# Patient Record
Sex: FEMALE | Race: ASIAN | NOT HISPANIC OR LATINO | Employment: OTHER | URBAN - METROPOLITAN AREA
[De-identification: names, ages, dates, MRNs, and addresses within clinical notes are randomized per-mention and may not be internally consistent; named-entity substitution may affect disease eponyms.]

---

## 2019-08-21 PROBLEM — M10.9 ACUTE GOUT OF LEFT FOOT: Status: ACTIVE | Noted: 2019-08-21

## 2019-08-21 PROBLEM — J30.1 SEASONAL ALLERGIC RHINITIS DUE TO POLLEN: Status: ACTIVE | Noted: 2019-08-21

## 2019-11-06 PROBLEM — Z72.0 TOBACCO USE: Status: ACTIVE | Noted: 2019-11-06

## 2021-01-08 ENCOUNTER — PATIENT MESSAGE (OUTPATIENT)
Dept: FAMILY MEDICINE | Facility: CLINIC | Age: 76
End: 2021-01-08

## 2021-01-08 ENCOUNTER — IMMUNIZATION (OUTPATIENT)
Dept: FAMILY MEDICINE | Facility: CLINIC | Age: 76
End: 2021-01-08
Payer: MEDICARE

## 2021-01-08 DIAGNOSIS — Z23 NEED FOR VACCINATION: ICD-10-CM

## 2021-01-08 PROCEDURE — 91300 COVID-19, MRNA, LNP-S, PF, 30 MCG/0.3 ML DOSE VACCINE: CPT | Mod: PBBFAC | Performed by: INTERNAL MEDICINE

## 2021-01-29 ENCOUNTER — IMMUNIZATION (OUTPATIENT)
Dept: FAMILY MEDICINE | Facility: CLINIC | Age: 76
End: 2021-01-29
Payer: MEDICARE

## 2021-01-29 DIAGNOSIS — Z23 NEED FOR VACCINATION: Primary | ICD-10-CM

## 2021-01-29 PROCEDURE — 91300 COVID-19, MRNA, LNP-S, PF, 30 MCG/0.3 ML DOSE VACCINE: CPT | Mod: PBBFAC | Performed by: FAMILY MEDICINE

## 2021-01-29 PROCEDURE — 0002A COVID-19, MRNA, LNP-S, PF, 30 MCG/0.3 ML DOSE VACCINE: CPT | Mod: PBBFAC | Performed by: FAMILY MEDICINE

## 2021-08-17 ENCOUNTER — IMMUNIZATION (OUTPATIENT)
Dept: FAMILY MEDICINE | Facility: CLINIC | Age: 76
End: 2021-08-17
Payer: COMMERCIAL

## 2021-08-17 DIAGNOSIS — Z23 NEED FOR VACCINATION: Primary | ICD-10-CM

## 2021-08-17 PROCEDURE — 91300 COVID-19, MRNA, LNP-S, PF, 30 MCG/0.3 ML DOSE VACCINE: CPT | Mod: PBBFAC | Performed by: RADIOLOGY

## 2022-02-17 PROBLEM — Z98.890 HISTORY OF LOOP RECORDER: Status: ACTIVE | Noted: 2022-02-17

## 2022-06-14 PROBLEM — Z86.79 HISTORY OF ATRIAL FIBRILLATION: Status: ACTIVE | Noted: 2022-06-14

## 2022-06-14 PROBLEM — M1A.9XX0 CHRONIC GOUT WITHOUT TOPHUS: Status: ACTIVE | Noted: 2022-06-14

## 2022-06-14 PROBLEM — I51.89 DIASTOLIC DYSFUNCTION: Status: ACTIVE | Noted: 2022-06-09

## 2022-06-14 PROBLEM — R45.89 ANXIETY ABOUT HEALTH: Status: ACTIVE | Noted: 2022-06-14

## 2022-06-14 PROBLEM — M62.838 MUSCLE SPASM OF BOTH LOWER LEGS: Status: ACTIVE | Noted: 2022-06-14

## 2022-06-15 PROBLEM — M10.9 ACUTE GOUT OF LEFT FOOT: Status: RESOLVED | Noted: 2019-08-21 | Resolved: 2022-06-15

## 2022-08-02 PROBLEM — L30.9 PSORIASIS-ECZEMA OVERLAP CONDITION: Status: ACTIVE | Noted: 2022-08-02

## 2023-05-02 PROBLEM — I70.0 AORTIC ATHEROSCLEROSIS: Status: ACTIVE | Noted: 2023-05-02

## 2023-05-02 PROBLEM — E66.01 SEVERE OBESITY (BMI 35.0-39.9) WITH COMORBIDITY: Status: ACTIVE | Noted: 2023-05-02

## 2023-08-03 ENCOUNTER — OFFICE VISIT (OUTPATIENT)
Dept: PODIATRY | Facility: CLINIC | Age: 78
End: 2023-08-03
Payer: MEDICARE

## 2023-08-03 DIAGNOSIS — M21.41 PES PLANUS OF BOTH FEET: ICD-10-CM

## 2023-08-03 DIAGNOSIS — M77.8 EXTENSOR TENDONITIS OF FOOT: Primary | ICD-10-CM

## 2023-08-03 DIAGNOSIS — M21.42 PES PLANUS OF BOTH FEET: ICD-10-CM

## 2023-08-03 PROCEDURE — 99203 OFFICE O/P NEW LOW 30 MIN: CPT | Mod: S$PBB,,, | Performed by: PODIATRIST

## 2023-08-03 PROCEDURE — 99999 PR PBB SHADOW E&M-EST. PATIENT-LVL II: CPT | Mod: PBBFAC,,, | Performed by: PODIATRIST

## 2023-08-03 PROCEDURE — 99203 PR OFFICE/OUTPT VISIT, NEW, LEVL III, 30-44 MIN: ICD-10-PCS | Mod: S$PBB,,, | Performed by: PODIATRIST

## 2023-08-03 PROCEDURE — 99212 OFFICE O/P EST SF 10 MIN: CPT | Mod: PBBFAC,PN | Performed by: PODIATRIST

## 2023-08-03 PROCEDURE — 99999 PR PBB SHADOW E&M-EST. PATIENT-LVL II: ICD-10-PCS | Mod: PBBFAC,,, | Performed by: PODIATRIST

## 2023-08-03 NOTE — PATIENT INSTRUCTIONS
- Recommend wearing supportive shoes only.  Avoid barefoot walking, flip flops, and Crocs, as this will exacerbate current symptoms.      - Shoes: Asics, Hoka or New Balance.      - Recommend icing the affected area a minimum of 20 minutes daily.    - Avoid high impact activities such as squatting, stooping, and running as these activities will exacerbate symptoms.      - May consider applying a topical analgesic (Voltaren cream) to help with pain symptoms.

## 2023-08-04 NOTE — PROGRESS NOTES
Subjective:     Patient ID: Silvia Bernal is a 77 y.o. female.    Chief Complaint: No chief complaint on file.    Silvia is a 77 y.o. female with a past medical history of Anxiety disorder, Arthralgia, Atrial fibrillation, Bruxism, Coagulation defect, Dizziness, LAWTON (dyspnea on exertion) (03/10/2016), Fatigue, History of chicken pox, Hyperglycemia, Hypertension, essential, Hypopotassemia, Irritable bowel syndrome (IBS), Obesity, SYLVAIN on CPAP, Osteoporosis, Polycythemia, secondary, PONV (postoperative nausea and vomiting), Thyroid nodule, Tobacco use, quit 4 yrs ago (11/6/2019), and Weight gain. The patient's chief complaint consists of bilateral foot pain that has been present for months.  Describes pain as burning that is localized to the dorsum of bilateral foot.  States symptoms occur initially with weight bearing and being to improve with sustained activity.  Denies an inciting event leading to said symptoms.  She attempts to be active in spite of said issue.  Notes a tendency to wear  style tennis shoes.  She has not attempted to self treat.  Denies any additional pedal complaints.      Past Medical History:   Diagnosis Date    Anxiety disorder     Arthralgia     Atrial fibrillation     RESOLVED    Bruxism     Coagulation defect     Dizziness     LAWTON (dyspnea on exertion) 03/10/2016    Fatigue     History of chicken pox     Hyperglycemia     Hypertension, essential     Hypopotassemia     Irritable bowel syndrome (IBS)     Obesity     SYLVAIN on CPAP     Osteoporosis     Polycythemia, secondary     PONV (postoperative nausea and vomiting)     Thyroid nodule     Tobacco use, quit 4 yrs ago 11/6/2019    Weight gain        Past Surgical History:   Procedure Laterality Date    anterior hip replacement Left 06/27/2013    bone spur removed  10/2009    CARDIAC CATHETERIZATION  03.2009, 03/10/69124    HYSTERECTOMY  1999    INSERTION OF IMPLANTABLE LOOP RECORDER      JOINT REPLACEMENT       LEFT HEART CATHETERIZATION Left 2022    Procedure: Left heart cath;  Surgeon: Kilo Maki III, MD;  Location: [facility];  Service: Cardiology;  Laterality: Left;    TOTAL ABDOMINAL HYSTERECTOMY W/ BILATERAL SALPINGOOPHORECTOMY      half an ovary was removed    TOTAL HIP ARTHROPLASTY Bilateral 03/10/2011    tubial ligation         Family History   Problem Relation Age of Onset    Cirrhosis Mother     Heart disease Father     Hypertension Father     Hypertension Sister     Diabetes Sister     Hypertension Sister     Diabetes Sister     Hypertension Sister     Diabetes Sister     Hypertension Sister     Diabetes Maternal Grandmother        Social History     Socioeconomic History    Marital status:    Tobacco Use    Smoking status: Former     Current packs/day: 0.00     Average packs/day: 1 pack/day for 50.0 years (50.0 ttl pk-yrs)     Types: Cigarettes     Start date: 1968     Quit date: 2018     Years since quittin.7    Smokeless tobacco: Never    Tobacco comments:     social only at parties   Substance and Sexual Activity    Alcohol use: Yes     Alcohol/week: 0.0 standard drinks of alcohol     Comment: very occasionally    Drug use: No   Social History Narrative    ** Merged History Encounter **            Current Outpatient Medications   Medication Sig Dispense Refill    allopurinoL (ZYLOPRIM) 100 MG tablet TAKE 2 TABLETS ONCE DAILY 180 tablet 1    ALPRAZolam (XANAX) 0.5 MG tablet Take 0.5 mg by mouth 2 (two) times daily as needed.      amLODIPine (NORVASC) 5 MG tablet TAKE 1 TABLET ONCE DAILY 90 tablet 3    ASPIRIN LOW DOSE ORAL Take 81 mg by mouth once daily.      calcitrioL (VECTICAL) 3 mcg/gram ointment Apply topically 2 (two) times daily. 100 g 1    cholecalciferol, vitamin D3, (VITAMIN D3) 25 mcg (1,000 unit) capsule Take 3,000 Units by mouth once daily.      compress.stocking,knee,reg,med Misc 1 Pair by Misc.(Non-Drug; Combo Route) route once  daily. 15-20 mmHg 2 each 1    EMU OIL, BULK, MISC 1 applicator by Misc.(Non-Drug; Combo Route) route as needed.      estradiol (ESTRACE) 0.5 MG tablet Take 0.5 mg by mouth Every 3 (three) days.       fluticasone propionate (FLONASE) 50 mcg/actuation nasal spray 2 sprays (100 mcg total) by Each Nostril route once daily. 16 mL 6    furosemide (LASIX) 20 MG tablet TAKE 1 TABLET (20 MG TOTAL) BY MOUTH DAILY AS NEEDED (SWELLING OR WEIGHT GAIN). 90 tablet 0    indomethacin (INDOCIN) 50 MG capsule Take 50 mg by mouth daily as needed.      levothyroxine (SYNTHROID) 50 MCG tablet Take 1 tablet (50 mcg total) by mouth once daily. 90 tablet 3    magnesium 250 mg Tab Take 1 tablet (250 mg total) by mouth every evening.  0    montelukast (SINGULAIR) 10 mg tablet TAKE 1 TABLET EVERY EVENING 90 tablet 3    naproxen (NAPROSYN) 500 MG tablet Take 1 tablet (500 mg total) by mouth 2 (two) times daily as needed (left lower back pain). 30 tablet 1    polyethylene glycol 400 (BLINK TEARS OPHT) Apply 1 drop to eye daily as needed.      potassium chloride (K-TAB) 20 mEq Take 1 tablet (20 mEq total) by mouth every morning. 90 tablet 3    ramipriL (ALTACE) 10 MG capsule TAKE 1 CAPSULE TWICE DAILY 180 capsule 3    SKYRIZI 150 mg/mL Syrg Inject into the skin.      VITAMIN B COMPLEX (B COMPLEX ORAL) Take 1 tablet by mouth once daily.       No current facility-administered medications for this visit.       Review of patient's allergies indicates:   Allergen Reactions    Phenergan [promethazine]     Adhesive tape-silicones      SURGICAL TAPE-ITCHY AND PEELS    Decongestant [cpm-phenyleph-acetaminophen]      palpitations    Fish containing products     Garlic     Mold     Msg [glutamic acid hcl]      Makes her sleepy    Unable to assess      Salt      Venom-honey bee      Bee Stings        Hemoglobin A1C   Date Value Ref Range Status   05/25/2022 6.2 (H) 0.0 - 5.6 % Final     Comment:     Reference Interval:  5.0 - 5.6  Normal   5.7 - 6.4 High Risk   > 6.5 Diabetic      Hgb A1c results are standardized based on the (NGSP) National   Glycohemoglobin Standardization Program.      Hemoglobin A1C levels are related to mean serum/plasma glucose   during the preceding 2-3 months.        10/08/2021 6.0 (H) 0.0 - 5.6 % Final     Comment:     Reference Interval:  5.0 - 5.6 Normal   5.7 - 6.4 High Risk   > 6.5 Diabetic      Hgb A1c results are standardized based on the (NGSP) National   Glycohemoglobin Standardization Program.      Hemoglobin A1C levels are related to mean serum/plasma glucose   during the preceding 2-3 months.        03/18/2021 5.6 0.0 - 5.6 % Final     Comment:     Reference Interval:  5.0 - 5.6 Normal   5.7 - 6.4 High Risk   > 6.5 Diabetic      Hgb A1c results are standardized based on the (NGSP) National   Glycohemoglobin Standardization Program.      Hemoglobin A1C levels are related to mean serum/plasma glucose   during the preceding 2-3 months.            Review of Systems   Constitutional: Negative for chills and fever.   Cardiovascular:  Negative for claudication and leg swelling.   Skin:  Negative for color change and nail changes.   Musculoskeletal:  Positive for myalgias. Negative for joint swelling, muscle cramps and muscle weakness.   Gastrointestinal:  Negative for nausea and vomiting.   Neurological:  Negative for numbness and paresthesias.   Psychiatric/Behavioral:  Negative for altered mental status.         Objective:     Physical Exam  Constitutional:       Appearance: Normal appearance. She is not ill-appearing.   Cardiovascular:      Pulses:           Dorsalis pedis pulses are 2+ on the right side and 2+ on the left side.        Posterior tibial pulses are 2+ on the right side and 2+ on the left side.      Comments: CFT is < 3 seconds bilateral.  Pedal hair growth is present bilateral.  No lower extremity edema noted bilateral.  Toes are warm to touch bilateral.     Musculoskeletal:         General:  Tenderness and deformity present. No signs of injury.      Right lower leg: No edema.      Left lower leg: No edema.      Comments: Muscle strength 5/5 in all muscle groups bilateral.  No tenderness nor crepitation with ROM of foot/ankle joints bilateral.  Mild pain with palpation to the extensor tendons, of bilateral foot, as they course along the midfoot.  Bilateral pes planus foot type.  Bilateral HAV.  Bilateral semi-reducible contracture of toes 2-5.     Skin:     General: Skin is warm and dry.      Capillary Refill: Capillary refill takes 2 to 3 seconds.      Findings: No bruising, ecchymosis, erythema, signs of injury, laceration, lesion, petechiae, rash or wound.      Comments: Pedal skin has normal turgor, temperature, and texture bilateral.  Toenails x 10 appear normotrophic. Examination of the skin reveals no evidence of significant maceration, rashes, open lesions, suspicious appearing nevi or other concerning lesions.       Neurological:      General: No focal deficit present.      Mental Status: She is alert.      Sensory: No sensory deficit.      Motor: No weakness or atrophy.      Comments: Light touch is intact bilateral.  (-) tinel sign bilateral.  Protective sensation is intact bilateral.           Assessment:      Encounter Diagnoses   Name Primary?    Extensor tendonitis of foot Yes    Pes planus of both feet      Plan:     Diagnoses and all orders for this visit:    Extensor tendonitis of foot  -     Ambulatory referral/consult to Physical/Occupational Therapy; Future    Pes planus of both feet  -     Ambulatory referral/consult to Physical/Occupational Therapy; Future      I counseled the patient on her conditions, their implications and medical management.    - Based on today's exam, pain symptoms are consistent with extensor tendonitis secondary to bilateral pes planus foot deformity.     - Recommend wearing supportive shoes only.  Discussed avoidance of barefoot walking, flip flops, and  Crocs, as this will exacerbate current symptoms.       - Recommend icing the affected area a minimum of 20 minutes daily.    - Discussed avoidance of high impact activities such as squatting, stooping, and running as these activities will exacerbate symptoms.       - May consider applying a topical analgesic (Voltaren cream) to help with pain symptoms.     - Referral written for PT for astym and dry needling.      - RTC prn or sooner if symptoms fail to resolve within 6 weeks.       Jose J Sykes DPM

## 2023-09-22 ENCOUNTER — OFFICE VISIT (OUTPATIENT)
Dept: PODIATRY | Facility: CLINIC | Age: 78
End: 2023-09-22
Payer: MEDICARE

## 2023-09-22 VITALS — BODY MASS INDEX: 36.73 KG/M2 | HEIGHT: 58 IN | WEIGHT: 175 LBS

## 2023-09-22 DIAGNOSIS — M79.671 FOOT PAIN, BILATERAL: ICD-10-CM

## 2023-09-22 DIAGNOSIS — M21.42 PES PLANUS OF BOTH FEET: Primary | ICD-10-CM

## 2023-09-22 DIAGNOSIS — M21.41 PES PLANUS OF BOTH FEET: Primary | ICD-10-CM

## 2023-09-22 DIAGNOSIS — M79.672 FOOT PAIN, BILATERAL: ICD-10-CM

## 2023-09-22 PROCEDURE — 99999 PR PBB SHADOW E&M-EST. PATIENT-LVL II: CPT | Mod: PBBFAC,,, | Performed by: PODIATRIST

## 2023-09-22 PROCEDURE — 99999 PR PBB SHADOW E&M-EST. PATIENT-LVL II: ICD-10-PCS | Mod: PBBFAC,,, | Performed by: PODIATRIST

## 2023-09-22 PROCEDURE — 99212 OFFICE O/P EST SF 10 MIN: CPT | Mod: PBBFAC,PN | Performed by: PODIATRIST

## 2023-09-22 PROCEDURE — 99213 OFFICE O/P EST LOW 20 MIN: CPT | Mod: S$PBB,,, | Performed by: PODIATRIST

## 2023-09-22 PROCEDURE — 99213 PR OFFICE/OUTPT VISIT, EST, LEVL III, 20-29 MIN: ICD-10-PCS | Mod: S$PBB,,, | Performed by: PODIATRIST

## 2023-09-23 NOTE — PROGRESS NOTES
Subjective:     Patient ID: Silvia Bernal is a 78 y.o. female.    Chief Complaint: Follow-up  Patient presents to clinic for roughly a 6 week follow up for bilateral EDL tendonitis.  Relates attending PT which has resulted in noticeable improvement in her muscle strength.  However, states she continues to experience deep aching pain in the feet with all weight bearing.  Rates pain currently as a 0/10 while at rest.  She continues wearing supportive shoe gear and is performing her stretching exercises.   Denies any additional pedal complaints.      Past Medical History:   Diagnosis Date    Anxiety disorder     Arthralgia     Atrial fibrillation     RESOLVED    Bruxism     Coagulation defect     Dizziness     LAWTON (dyspnea on exertion) 03/10/2016    Fatigue     History of chicken pox     Hyperglycemia     Hypertension, essential     Hypopotassemia     Irritable bowel syndrome (IBS)     Obesity     SYLVAIN on CPAP     Osteoporosis     Polycythemia, secondary     PONV (postoperative nausea and vomiting)     Thyroid nodule     Tobacco use, quit 4 yrs ago 11/6/2019    Weight gain        Past Surgical History:   Procedure Laterality Date    anterior hip replacement Left 06/27/2013    bone spur removed  10/2009    CARDIAC CATHETERIZATION  03.2009, 03/10/98198    HYSTERECTOMY  1999    INSERTION OF IMPLANTABLE LOOP RECORDER      JOINT REPLACEMENT      LEFT HEART CATHETERIZATION Left 6/6/2022    Procedure: Left heart cath;  Surgeon: Kilo Maki III, MD;  [facility];  Service: Cardiology;  Laterality: Left;    TOTAL ABDOMINAL HYSTERECTOMY W/ BILATERAL SALPINGOOPHORECTOMY  1999    half an ovary was removed    TOTAL HIP ARTHROPLASTY Bilateral 03/10/2011    tubial ligation         Family History   Problem Relation Age of Onset    Cirrhosis Mother     Heart disease Father     Hypertension Father     Hypertension Sister     Diabetes Sister     Hypertension Sister     Diabetes Sister     Hypertension Sister     Diabetes  Sister     Hypertension Sister     Diabetes Maternal Grandmother        Social History     Socioeconomic History    Marital status:    Tobacco Use    Smoking status: Former     Current packs/day: 0.00     Average packs/day: 1 pack/day for 50.0 years (50.0 ttl pk-yrs)     Types: Cigarettes     Start date: 1968     Quit date: 2018     Years since quittin.8    Smokeless tobacco: Never    Tobacco comments:     social only at parties   Substance and Sexual Activity    Alcohol use: Yes     Alcohol/week: 0.0 standard drinks of alcohol     Comment: very occasionally    Drug use: No   Social History Narrative    ** Merged History Encounter **            Current Outpatient Medications   Medication Sig Dispense Refill    allopurinoL (ZYLOPRIM) 100 MG tablet TAKE 2 TABLETS ONCE DAILY 180 tablet 1    ALPRAZolam (XANAX) 0.5 MG tablet Take 0.5 mg by mouth 2 (two) times daily as needed.      amLODIPine (NORVASC) 5 MG tablet TAKE 1 TABLET ONCE DAILY 90 tablet 3    ASPIRIN LOW DOSE ORAL Take 81 mg by mouth once daily.      calcitrioL (VECTICAL) 3 mcg/gram ointment Apply topically 2 (two) times daily. 100 g 1    cholecalciferol, vitamin D3, (VITAMIN D3) 25 mcg (1,000 unit) capsule Take 3,000 Units by mouth once daily.      compress.stocking,knee,reg,med Misc 1 Pair by Misc.(Non-Drug; Combo Route) route once daily. 15-20 mmHg 2 each 1    EMU OIL, BULK, MISC 1 applicator by Misc.(Non-Drug; Combo Route) route as needed.      estradiol (ESTRACE) 0.5 MG tablet Take 0.5 mg by mouth Every 3 (three) days.       fluticasone propionate (FLONASE) 50 mcg/actuation nasal spray 2 sprays (100 mcg total) by Each Nostril route once daily. 16 mL 6    furosemide (LASIX) 20 MG tablet Take 1 tablet (20 mg total) by mouth daily as needed (swelling or weight gain). 90 tablet 1    indomethacin (INDOCIN) 50 MG capsule Take 50 mg by mouth daily as needed.      levothyroxine (SYNTHROID) 50 MCG tablet TAKE 1 TABLET BY MOUTH EVERY DAY 90  tablet 3    magnesium 250 mg Tab Take 1 tablet (250 mg total) by mouth every evening.  0    montelukast (SINGULAIR) 10 mg tablet TAKE 1 TABLET EVERY EVENING 90 tablet 3    naproxen (NAPROSYN) 500 MG tablet Take 1 tablet (500 mg total) by mouth 2 (two) times daily as needed (left lower back pain). 30 tablet 1    polyethylene glycol 400 (BLINK TEARS OPHT) Apply 1 drop to eye daily as needed.      potassium chloride (K-TAB) 20 mEq Take 1 tablet (20 mEq total) by mouth every morning. 90 tablet 3    ramipriL (ALTACE) 10 MG capsule TAKE 1 CAPSULE TWICE DAILY 180 capsule 3    SKYRIZI 150 mg/mL Syrg Inject into the skin.      VITAMIN B COMPLEX (B COMPLEX ORAL) Take 1 tablet by mouth once daily.       No current facility-administered medications for this visit.       Review of patient's allergies indicates:   Allergen Reactions    Phenergan [promethazine]     Adhesive tape-silicones      SURGICAL TAPE-ITCHY AND PEELS    Decongestant [cpm-phenyleph-acetaminophen]      palpitations    Fish containing products     Garlic     Mold     Msg [glutamic acid hcl]      Makes her sleepy    Unable to assess      Salt      Venom-honey bee      Bee Stings        Hemoglobin A1C   Date Value Ref Range Status   05/25/2022 6.2 (H) 0.0 - 5.6 % Final     Comment:     Reference Interval:  5.0 - 5.6 Normal   5.7 - 6.4 High Risk   > 6.5 Diabetic      Hgb A1c results are standardized based on the (NGSP) National   Glycohemoglobin Standardization Program.      Hemoglobin A1C levels are related to mean serum/plasma glucose   during the preceding 2-3 months.        10/08/2021 6.0 (H) 0.0 - 5.6 % Final     Comment:     Reference Interval:  5.0 - 5.6 Normal   5.7 - 6.4 High Risk   > 6.5 Diabetic      Hgb A1c results are standardized based on the (NGSP) National   Glycohemoglobin Standardization Program.      Hemoglobin A1C levels are related to mean serum/plasma glucose   during the preceding 2-3 months.        03/18/2021 5.6 0.0 - 5.6 % Final      Comment:     Reference Interval:  5.0 - 5.6 Normal   5.7 - 6.4 High Risk   > 6.5 Diabetic      Hgb A1c results are standardized based on the (NGSP) National   Glycohemoglobin Standardization Program.      Hemoglobin A1C levels are related to mean serum/plasma glucose   during the preceding 2-3 months.            Review of Systems   Constitutional: Negative for chills and fever.   Cardiovascular:  Negative for claudication and leg swelling.   Skin:  Negative for color change and nail changes.   Musculoskeletal:  Positive for myalgias. Negative for joint swelling, muscle cramps and muscle weakness.   Gastrointestinal:  Negative for nausea and vomiting.   Neurological:  Negative for numbness and paresthesias.   Psychiatric/Behavioral:  Negative for altered mental status.         Objective:     Physical Exam  Constitutional:       Appearance: Normal appearance. She is not ill-appearing.   Cardiovascular:      Pulses:           Dorsalis pedis pulses are 2+ on the right side and 2+ on the left side.        Posterior tibial pulses are 2+ on the right side and 2+ on the left side.      Comments: CFT is < 3 seconds bilateral.  Pedal hair growth is present bilateral.  No lower extremity edema noted bilateral.  Toes are warm to touch bilateral.     Musculoskeletal:         General: Deformity present. No tenderness or signs of injury.      Right lower leg: No edema.      Left lower leg: No edema.      Comments: Muscle strength 5/5 in all muscle groups bilateral.  No tenderness nor crepitation with ROM of foot/ankle joints bilateral.  (-) for pain with palpation to the extensor tendons, of bilateral foot, as they course along the midfoot.  Bilateral pes planus foot type.  Bilateral HAV.  Bilateral semi-reducible contracture of toes 2-5.     Skin:     General: Skin is warm and dry.      Capillary Refill: Capillary refill takes 2 to 3 seconds.      Findings: No bruising, ecchymosis, erythema, signs of injury, laceration, lesion,  petechiae, rash or wound.      Comments: Pedal skin has normal turgor, temperature, and texture bilateral.  Toenails x 10 appear normotrophic. Examination of the skin reveals no evidence of significant maceration, rashes, open lesions, suspicious appearing nevi or other concerning lesions.       Neurological:      General: No focal deficit present.      Mental Status: She is alert.      Sensory: No sensory deficit.      Motor: No weakness or atrophy.      Comments: Light touch is intact bilateral.  (-) tinel sign bilateral.  Protective sensation is intact bilateral.             Assessment:      Encounter Diagnoses   Name Primary?    Pes planus of both feet Yes    Foot pain, bilateral      Plan:     Silvia was seen today for follow-up.    Diagnoses and all orders for this visit:    Pes planus of both feet  -     ORTHOTIC DEVICE (DME)    Foot pain, bilateral      I counseled the patient on her conditions, their implications and medical management.    - With today's exam, prior EDL tendonitis has resolved.  Current pain symptoms are secondary to her bilateral pes planus foot deformity.    - Rx written for a custom molded orthotic with medial heel wedge to limit overpronation.    - Given the name of a local vendor who provides this service.    - Recommend wearing supportive shoes only.  Discussed avoidance of barefoot walking, flip flops, and Crocs, as this will exacerbate current symptoms.       - RTC 2 weeks after obtaining the custom molded orthotics to assess the fit and to see if there needs to be modifications to the Rx.     Jose J Sykes DPM

## 2023-10-23 ENCOUNTER — TELEPHONE (OUTPATIENT)
Dept: PODIATRY | Facility: CLINIC | Age: 78
End: 2023-10-23
Payer: MEDICARE

## 2023-10-23 NOTE — TELEPHONE ENCOUNTER
Spoke with patient and she voiced understanding that she may want to see her PCP as her S/S are not indicative of orthotic use

## 2023-12-28 ENCOUNTER — OFFICE VISIT (OUTPATIENT)
Dept: PRIMARY CARE CLINIC | Facility: CLINIC | Age: 78
End: 2023-12-28
Payer: MEDICARE

## 2023-12-28 VITALS
HEART RATE: 73 BPM | HEIGHT: 58 IN | OXYGEN SATURATION: 97 % | BODY MASS INDEX: 37.6 KG/M2 | SYSTOLIC BLOOD PRESSURE: 138 MMHG | DIASTOLIC BLOOD PRESSURE: 70 MMHG | WEIGHT: 179.13 LBS

## 2023-12-28 DIAGNOSIS — Z87.891 HISTORY OF NICOTINE DEPENDENCE: ICD-10-CM

## 2023-12-28 DIAGNOSIS — G47.33 OSA ON CPAP: ICD-10-CM

## 2023-12-28 DIAGNOSIS — Z72.0 TOBACCO USE: ICD-10-CM

## 2023-12-28 DIAGNOSIS — E78.00 HYPERCHOLESTEROLEMIA: ICD-10-CM

## 2023-12-28 DIAGNOSIS — Z98.890 HISTORY OF LOOP RECORDER: ICD-10-CM

## 2023-12-28 DIAGNOSIS — M1A.9XX0 CHRONIC GOUT WITHOUT TOPHUS, UNSPECIFIED CAUSE, UNSPECIFIED SITE: ICD-10-CM

## 2023-12-28 DIAGNOSIS — I70.0 AORTIC ATHEROSCLEROSIS: ICD-10-CM

## 2023-12-28 DIAGNOSIS — I10 HYPERTENSION, ESSENTIAL: ICD-10-CM

## 2023-12-28 DIAGNOSIS — I51.89 DIASTOLIC DYSFUNCTION: ICD-10-CM

## 2023-12-28 DIAGNOSIS — Z76.89 ENCOUNTER TO ESTABLISH CARE: Primary | ICD-10-CM

## 2023-12-28 DIAGNOSIS — J30.1 SEASONAL ALLERGIC RHINITIS DUE TO POLLEN: ICD-10-CM

## 2023-12-28 DIAGNOSIS — E03.9 ACQUIRED HYPOTHYROIDISM: ICD-10-CM

## 2023-12-28 DIAGNOSIS — I48.0 PAROXYSMAL ATRIAL FIBRILLATION: ICD-10-CM

## 2023-12-28 DIAGNOSIS — Z86.79 HISTORY OF ATRIAL FIBRILLATION: ICD-10-CM

## 2023-12-28 DIAGNOSIS — E66.01 SEVERE OBESITY (BMI 35.0-39.9) WITH COMORBIDITY: ICD-10-CM

## 2023-12-28 PROCEDURE — 99214 OFFICE O/P EST MOD 30 MIN: CPT | Mod: PBBFAC,PN | Performed by: INTERNAL MEDICINE

## 2023-12-28 PROCEDURE — 99999 PR PBB SHADOW E&M-EST. PATIENT-LVL IV: CPT | Mod: PBBFAC,,, | Performed by: INTERNAL MEDICINE

## 2023-12-28 PROCEDURE — 99205 OFFICE O/P NEW HI 60 MIN: CPT | Mod: S$PBB,,, | Performed by: INTERNAL MEDICINE

## 2023-12-28 RX ORDER — AZELASTINE 1 MG/ML
1 SPRAY, METERED NASAL 2 TIMES DAILY
Qty: 30 ML | Refills: 0 | Status: SHIPPED | OUTPATIENT
Start: 2023-12-28 | End: 2024-12-27

## 2023-12-28 RX ORDER — MULTIVIT,STRESS FORMULA/ZINC
TABLET ORAL
COMMUNITY
End: 2024-02-02

## 2023-12-28 RX ORDER — GLUCOSAMINE/CHONDRO SU A 500-400 MG
1 TABLET ORAL 2 TIMES DAILY
COMMUNITY
End: 2024-05-28

## 2023-12-28 NOTE — PROGRESS NOTES
[facility] INITIAL VISIT [facility]      CHIEF COMPLAINT     Chief Complaint   Patient presents with    Establish Care     Patient presents to establish care.    Allergic Rhinitis      Patient is having clear sinus drainage and congestion, and has been on Singulair for years.    Psoriasis     Patient is on Skyrizi and has questions about exposure, since she said she is immunocompromised.    COVID-19 Post Vaccine Symptoms     Patient tested positive x11 days ago.       HPI     Silvia Bernal is a 78 y.o.  female who presents with a PMHx of  history of Afib, HTN, HLD, IBS, SYLVAIN on CPAP, osteoporosis, thyroid nodule, ex-smoker (quit in 2015) who presents today to establish care.     Her main complaints and concerns are:   Allergies. Discussed treatment    Anxiety: says her anxiety is well controlled. Says has lot of friends and social support   GAD7 score is 7/21    IBS: with both constipation/diarrhea. Mostly constipation    SYLVAIN: on CPAP which she reports she does use every night.       CHRONIC HEALTH ISSUES:   Past Medical History:  Past Medical History:   Diagnosis Date    Anxiety disorder     Arthralgia     Atrial fibrillation     RESOLVED    Bruxism     Coagulation defect     Dizziness     LAWTON (dyspnea on exertion) 03/10/2016    Fatigue     History of chicken pox     Hyperglycemia     Hypertension, essential     Hypopotassemia     Irritable bowel syndrome (IBS)     Obesity     SYLVAIN on CPAP     Osteoporosis     Polycythemia, secondary     PONV (postoperative nausea and vomiting)     Thyroid nodule     Tobacco use, quit 4 yrs ago 11/6/2019    Weight gain        Date: 12/28/2023    Power of   I initiated the process of voluntary advance care planning today and explained the importance of this process to the patient.  I introduced the concept of advance directives to the patient, as well. Then the patient received detailed information about the importance of designating a Health Care Power of   (HCPOA). She was also instructed to communicate with this person about their wishes for future healthcare, should she become sick and lose decision-making capacity. The patient has not previously appointed a HCPOA. After our discussion, the patient has decided to complete a HCPOA and has appointed her  friend , health care agent:  on file  & health care agent number:  on file  I spent a total time of 10 minutes discussing this issue with the patient.              Worry score 1    Past Surgical History:  Past Surgical History:   Procedure Laterality Date    anterior hip replacement Left 06/27/2013    bone spur removed  10/2009    CARDIAC CATHETERIZATION  03.2009, 03/10/49736    HYSTERECTOMY  1999    INSERTION OF IMPLANTABLE LOOP RECORDER      JOINT REPLACEMENT      LEFT HEART CATHETERIZATION Left 6/6/2022    Procedure: Left heart cath;  Surgeon: Kilo Maki III, MD;  Location: [facility] CATH;  Service: Cardiology;  Laterality: Left;    TOTAL ABDOMINAL HYSTERECTOMY W/ BILATERAL SALPINGOOPHORECTOMY  1999    half an ovary was removed    TOTAL HIP ARTHROPLASTY Bilateral 03/10/2011    tubial ligation         Allergies:  Review of patient's allergies indicates:   Allergen Reactions    Phenergan [promethazine]     Adhesive tape-silicones      SURGICAL TAPE-ITCHY AND PEELS    Decongestant [cpm-phenyleph-acetaminophen]      palpitations    Fish containing products     Garlic     Mold     Msg [glutamic acid hcl]      Makes her sleepy    Unable to assess      Salt      Venom-honey bee      Bee Stings       Home Medications:  Prior to Admission medications    Medication Sig Start Date End Date Taking? Authorizing Provider   allopurinoL (ZYLOPRIM) 100 MG tablet TAKE 2 TABLETS ONCE DAILY 10/30/23   Bailee Dong, NP   ALPRAZolam (XANAX) 0.5 MG tablet Take 0.5 mg by mouth 2 (two) times daily as needed. 3/16/23   Shahram Barrientos MD   amLODIPine (NORVASC) 5 MG tablet TAKE 1 TABLET ONCE DAILY 5/7/23   Bailee Dong,  NP   ASPIRIN LOW DOSE ORAL Take 81 mg by mouth once daily. 6/9/15   Provider, Historical   calcitrioL (VECTICAL) 3 mcg/gram ointment Apply topically 2 (two) times daily. 12/3/22 12/15/23  Gwen Elkins,    cholecalciferol, vitamin D3, (VITAMIN D3) 25 mcg (1,000 unit) capsule Take 3,000 Units by mouth once daily.    Provider, Historical   compress.stocking,knee,reg,med Misc 1 Pair by Misc.(Non-Drug; Combo Route) route once daily. 15-20 mmHg 6/14/22   Bharat, Silvestre, APRN   EMU OIL, BULK, MISC 1 applicator by Misc.(Non-Drug; Combo Route) route as needed.    Provider, Historical   estradiol (ESTRACE) 0.5 MG tablet Take 0.5 mg by mouth Every 3 (three) days.  9/3/15   Provider, Historical   fluticasone propionate (FLONASE) 50 mcg/actuation nasal spray 2 sprays (100 mcg total) by Each Nostril route once daily. 7/5/22   Juliocesar Irizarry II, MD   furosemide (LASIX) 20 MG tablet TAKE 1 TABLET (20 MG TOTAL) BY MOUTH DAILY AS NEEDED (SWELLING OR WEIGHT GAIN). 12/20/23   Bailee Dong, NP   indomethacin (INDOCIN) 50 MG capsule Take 50 mg by mouth daily as needed.    Provider, Historical   levothyroxine (SYNTHROID) 50 MCG tablet TAKE 1 TABLET ONCE DAILY 12/19/23   Bailee Dong, NP   magnesium 250 mg Tab Take 1 tablet (250 mg total) by mouth every evening. 6/9/22   Kristen Villafana, MARIALUISA   montelukast (SINGULAIR) 10 mg tablet TAKE 1 TABLET EVERY EVENING 5/7/23   Bailee Dong, NP   naproxen (NAPROSYN) 500 MG tablet Take 1 tablet (500 mg total) by mouth 2 (two) times daily as needed (left lower back pain). 6/25/21   Sheldon Traore, DO   polyethylene glycol 400 (BLINK TEARS OPHT) Apply 1 drop to eye daily as needed.    Provider, Historical   potassium chloride (K-TAB) 20 mEq Take 1 tablet (20 mEq total) by mouth every morning. 9/22/23   Juliocesar Irizarry II, MD   ramipriL (ALTACE) 10 MG capsule TAKE 1 CAPSULE TWICE DAILY 5/8/23   Kilo Maki III, MD SKYRIZI 150 mg/mL Syrg Inject into the skin. 1/27/23    "Medical, First Priority   VITAMIN B COMPLEX (B COMPLEX ORAL) Take 1 tablet by mouth once daily.    Provider, Historical       Family History:  Family History   Problem Relation Age of Onset    Cirrhosis Mother     Heart disease Father     Hypertension Father     Hypertension Sister     Diabetes Sister     Hypertension Sister     Diabetes Sister     Hypertension Sister     Diabetes Sister     Hypertension Sister     Diabetes Maternal Grandmother        Social History:  Social History     Tobacco Use    Smoking status: Former     Current packs/day: 0.00     Average packs/day: 1 pack/day for 50.0 years (50.0 ttl pk-yrs)     Types: Cigarettes     Start date: 1968     Quit date: 2018     Years since quittin.2    Smokeless tobacco: Never    Tobacco comments:     social only at parties   Substance Use Topics    Alcohol use: Yes     Alcohol/week: 0.0 standard drinks of alcohol     Comment: very occasionally    Drug use: No       Review of Systems:  ROS    PHYSICAL EXAM     /70 (BP Location: Right forearm, Patient Position: Sitting, BP Method: Large (Manual))   Pulse 73   Ht 4' 10" (1.473 m)   Wt 81.3 kg (179 lb 2 oz)   SpO2 97%   BMI 37.44 kg/m²     GEN - A+OX4, NAD   HEENT - PERRL, EOMI, OP clear. MMM.   Neck - No thyromegaly or cervical LAD. No thyroid masses felt.  CV - RRR, no m/r   Chest - CTAB, no wheezing or rhonchi  Abd - S/NT/ND/+BS.   Ext - 2+BDP and radial pulses. No LE edema.   Neuro - PERRL, EOMI, no nystagmus, eyebrow raise, facial sensation, hearing, m of mastication, smile, palatal raise, shoulder shrug, tongue protrusion symmetric and intact. 5/5 BUE and BLE strength. Sensation to light touch intact throughout. 2+ DTRs. Normal gait.   MSK - No spinal tenderness to palpation. Normal gait.   Skin - No rash.    LABS     Previous labs reviewed.      ASSESSMENT/PLAN     Silvia Bernal is a 78 y.o. female with  1. Encounter to establish care  -medications reviewed and " consolidated  -reviewed PMH, medications, HCM including vaccinations.   -reviewed most recent lab work. Reordered as needed. Discussed abnormalities with patient with time allowed for questions.   -reviewed clinic policy with patient including to arrive 15 mins before appointments, labs and results including expected turn around for results.   -outside records and consultants notes reviewed as time allowed. Updated treatment team with name of other providers.   -reviewed and confirmed patients contact information, preferred pharmacy etc.   -AVS provided after visit to summarized things discussed.   -The following assessments were completed:  Living Situation  CAGE  Depression Screening  Timed Get Up and Go  Cognitive Function Screening-Minicog   Nutrition Screening  ADL Screening      2. Seasonal allergic rhinitis due to pollen  Overview:  Discussed antihistamine and ICS  Avoid allergens       3. Hypertension, essential  Overview:  BP is well controlled  Continue current management  Limit salt in diet  Encouraged more consistent cardio excercise      4. Hypercholesterolemia  Overview:  Cholesterol is very well controlled. Continue current management   Limit cholesterol in diet .  Encouraged consistent cardio excercise      5. Paroxysmal atrial fibrillation  Rate controlled and a/c   Asymptomatic     6. Aortic atherosclerosis  Cholesterol is very wlel controlled  Limit dietary cholesterol intake   Encouraged consistent cardio     7. Acquired hypothyroidism  Overview:  Tsh wnl  Continue current management  Asymptomatic       8. Severe obesity (BMI 35.0-39.9) with comorbidity  Overview:  Encouraged safe weight loss via low calorie diet and exercise       9. Chronic gout without tophus, unspecified cause, unspecified site  Overview:  Asymptomatic  Discussed limiting protein intake       10. SYLVAIN on CPAP  Compliance with CPAP     11. Tobacco use, quit 4 yrs ago  Encouraged to maintain     12. History of loop  recorder    13. Diastolic dysfunction  Asymptomatic  Contiue current management    14. History of atrial fibrillation  asymptonatic    15. History of nicotine dependence      Other orders  -     azelastine (ASTELIN) 137 mcg (0.1 %) nasal spray; 1 spray (137 mcg total) by Nasal route 2 (two) times daily.  Dispense: 30 mL; Refill: 0         ACP: discussion had about ACP to include definition of ACP, HCP, CODE STATUS. Paperwork handed to patient, to review, discuss with family and return it to clinic to be scanned into the chart.   Advance Care Planning       My total time spent on this encounter was at least 60 minutes which included  the following activities: preparing to see the patient, performing a medically appropriate and/or evaluation, counseling and educating the patient and family/caregiver, ordering medications, tests, or procedures, referring and communicating with other healthcare providers, documenting clinical information in the electronic or other health record. This time is independent and non-overlapping.     RTC in 6 months, sooner if needed and depending on labs.    Eloisa Louis MD  Board Certified Internist/Geriatrician  [facility]

## 2024-02-23 DIAGNOSIS — I51.89 DIASTOLIC DYSFUNCTION: ICD-10-CM

## 2024-02-23 RX ORDER — POTASSIUM CHLORIDE 1500 MG/1
20 TABLET, EXTENDED RELEASE ORAL EVERY MORNING
Qty: 90 TABLET | Refills: 3 | Status: SHIPPED | OUTPATIENT
Start: 2024-02-23

## 2024-02-23 NOTE — TELEPHONE ENCOUNTER
----- Message from Jessica Quiroga sent at 2/23/2024  3:02 PM CST -----  Regarding: Refill  Type:  RX Refill Request    Who Called: pt   Refill or New Rx:refill    RX Name and Strength:potassium chloride (K-TAB) 20 mEq  How is the patient currently taking it? (ex. 1XDay):as directed     Is this a 30 day or 90 day RX:90  Preferred Pharmacy with phone number:  [facility]/pharmacy #[id number] - [place] - [address]  [place] [address]  Phone: [phone] Fax: [phone]          Local or Mail Order:local   Ordering Provider:salvador Borja Call Back Number:[phone]   Additional Information: Requesting call back once sent to the pharmacy  please advise thank you

## 2024-03-26 DIAGNOSIS — M10.472 OTHER SECONDARY ACUTE GOUT OF LEFT FOOT: ICD-10-CM

## 2024-03-26 RX ORDER — ALLOPURINOL 100 MG/1
200 TABLET ORAL DAILY
Qty: 180 TABLET | Refills: 1 | Status: SHIPPED | OUTPATIENT
Start: 2024-03-26

## 2024-03-26 NOTE — TELEPHONE ENCOUNTER
----- Message from Katerin Wolfe sent at 3/26/2024  2:26 PM CDT -----  Regarding: Refill  Type:  RX Refill Request    Who Called: Pt    Refill or New Rx:Refill     RX Name and Strength:allopurinoL (ZYLOPRIM) 100 MG tablet And  potassium chloride (K-TAB) 20 mEq       Preferred Pharmacy with phone number:  [facility]/pharmacy #[id number] - [place] - [address]  [address]  [facility] [place] [address]  Phone: [phone] Fax: [phone]      Local or Mail Order:local      Would the patient rather a call back or a response via [facility]? Call back    Best Call Back Number:[phone]    Additional Information: Pt request a refill a few day ago and did not get her refill. Thank you

## 2024-04-09 ENCOUNTER — TELEPHONE (OUTPATIENT)
Dept: PRIMARY CARE CLINIC | Facility: CLINIC | Age: 79
End: 2024-04-09
Payer: MEDICARE

## 2024-04-09 NOTE — TELEPHONE ENCOUNTER
----- Message from Lakeshia Tilley sent at 4/9/2024 10:34 AM CDT -----  Regarding: pt advice - neurolgy referral  [phone] - need referral for a neurolgist for her legs and feet not getting any better.Asking whom  going to recommend.      Lakeshia

## 2024-04-09 NOTE — TELEPHONE ENCOUNTER
Patient c/o BLE numbness x 2 years, but which has gotten worse. I suggested she come in to discuss the issue with Dr. Louis. Patient scheduled for Friday morning 4/12/24 at 8:00.

## 2024-04-12 ENCOUNTER — OFFICE VISIT (OUTPATIENT)
Dept: PRIMARY CARE CLINIC | Facility: CLINIC | Age: 79
End: 2024-04-12
Payer: MEDICARE

## 2024-04-12 DIAGNOSIS — R20.0 BILATERAL LEG NUMBNESS: Primary | ICD-10-CM

## 2024-04-12 DIAGNOSIS — E66.01 SEVERE OBESITY (BMI 35.0-39.9) WITH COMORBIDITY: ICD-10-CM

## 2024-04-12 DIAGNOSIS — R20.2 NUMBNESS AND TINGLING: ICD-10-CM

## 2024-04-12 DIAGNOSIS — I70.0 AORTIC ATHEROSCLEROSIS: ICD-10-CM

## 2024-04-12 DIAGNOSIS — R53.83 FATIGUE, UNSPECIFIED TYPE: ICD-10-CM

## 2024-04-12 DIAGNOSIS — E78.00 HYPERCHOLESTEROLEMIA: ICD-10-CM

## 2024-04-12 DIAGNOSIS — R20.0 NUMBNESS AND TINGLING: ICD-10-CM

## 2024-04-12 DIAGNOSIS — R73.01 ELEVATED FASTING GLUCOSE: ICD-10-CM

## 2024-04-12 DIAGNOSIS — R73.03 PREDIABETES: ICD-10-CM

## 2024-04-12 DIAGNOSIS — E03.4 HYPOTHYROIDISM DUE TO ACQUIRED ATROPHY OF THYROID: ICD-10-CM

## 2024-04-12 PROCEDURE — 99214 OFFICE O/P EST MOD 30 MIN: CPT | Mod: S$PBB,,, | Performed by: INTERNAL MEDICINE

## 2024-04-12 PROCEDURE — 99999 PR PBB SHADOW E&M-EST. PATIENT-LVL III: CPT | Mod: PBBFAC,,, | Performed by: INTERNAL MEDICINE

## 2024-04-12 PROCEDURE — 99213 OFFICE O/P EST LOW 20 MIN: CPT | Mod: PBBFAC,PN | Performed by: INTERNAL MEDICINE

## 2024-04-12 RX ORDER — ROSUVASTATIN CALCIUM 5 MG/1
5 TABLET, COATED ORAL DAILY
Qty: 90 TABLET | Refills: 3 | Status: SHIPPED | OUTPATIENT
Start: 2024-04-12 | End: 2024-05-28 | Stop reason: SDUPTHER

## 2024-04-12 RX ORDER — INDOMETHACIN 50 MG/1
50 CAPSULE ORAL DAILY PRN
Status: CANCELLED | OUTPATIENT
Start: 2024-04-12

## 2024-04-12 RX ORDER — LEVOTHYROXINE SODIUM 50 UG/1
50 TABLET ORAL
Qty: 90 TABLET | Refills: 1 | Status: SHIPPED | OUTPATIENT
Start: 2024-04-12

## 2024-04-12 NOTE — PROGRESS NOTES
"INTERNAL MEDICINE PROGRESS/URGENT CARE NOTE    CHIEF COMPLAINT     Chief Complaint   Patient presents with    Numbness     Numbness and tingling in both feet       HPI     Silvia Bernal is a 78 y.o.  female who presents for an urgent/follow up visit today.  Patient is here today for urgent visit  Called in a few days ago to complain of numbness of both feet.   Today, she describes unclear history to deliniate vascular vs nerve problem or could be both. First described that she has bumbness in her feet when she gets up in the am, and when she sits for too long like recently when she went to a show and sat there for over two hours. She has to stand for a while and let the "blood return to my legs before I can feel my feet and start walking"   On further ROS, mentions that she also has to stop when she goes shopping with her friends due to jagruti in her legs. Reviewed the old arterial US and venous. Going to see her cardiologist shortly and says she will discuss this. Disc ussed doing a repeat arterial study for what sounds like claudication.   Asked patient why she's not on a statin and I'm not sure what the answer was. Again unclear . Says her cardiologist asked her the same question and she replied "Well I like apple cider vinegar" . I've advised patient I would recommend statin use based on the review of her old arterial US LE.     Denies back pain. Says the numbness is episodic and usually occurs under the circumstances discussed above.   Was requesting to see neurologist which I agreed to but thinks she will see podiatry first whom she's seen last year for foot  pain diagnosed as tendonitis which improved with show inserts.       Past Medical History:  Past Medical History:   Diagnosis Date    Anxiety disorder     Arthralgia     Atrial fibrillation     RESOLVED    Bruxism     Coagulation defect     Dizziness     LAWTON (dyspnea on exertion) 03/10/2016    Fatigue     History of chicken pox     Hyperglycemia     " Hypertension, essential     Hypopotassemia     Irritable bowel syndrome (IBS)     Obesity     SYLVAIN on CPAP     Osteoporosis     Polycythemia, secondary     PONV (postoperative nausea and vomiting)     Thyroid nodule     Tobacco use, quit 4 yrs ago 11/6/2019    Weight gain        Home Medications:  Prior to Admission medications    Medication Sig Start Date End Date Taking? Authorizing Provider   allopurinoL (ZYLOPRIM) 100 MG tablet Take 2 tablets (200 mg total) by mouth once daily. 3/26/24   Eloisa Louis MD   ASPIRIN LOW DOSE ORAL Take 81 mg by mouth once daily. 6/9/15   Provider, Historical   azelastine (ASTELIN) 137 mcg (0.1 %) nasal spray 1 spray (137 mcg total) by Nasal route 2 (two) times daily. 12/28/23 12/27/24  Eloisa Louis MD   calcitrioL (VECTICAL) 3 mcg/gram ointment Apply topically 2 (two) times daily. 12/3/22 12/15/23  Gwen Elkins,    cholecalciferol, vitamin D3, (VITAMIN D3) 25 mcg (1,000 unit) capsule Take 3,000 Units by mouth once daily.    Provider, Historical   compress.stocking,knee,reg,med Misc 1 Pair by Misc.(Non-Drug; Combo Route) route once daily. 15-20 mmHg 6/14/22   Silvestre Nicholson APRN   diltiaZEM (CARDIZEM CD) 180 MG 24 hr capsule Take 1 capsule (180 mg total) by mouth once daily. 2/22/24 2/21/25  Kilo Maki III, MD   EMU OIL, BULK, MISC 1 applicator by Misc.(Non-Drug; Combo Route) route as needed.    Provider, Historical   estradiol (ESTRACE) 0.5 MG tablet Take 0.5 mg by mouth Every 3 (three) days.  9/3/15   Provider, Historical   furosemide (LASIX) 20 MG tablet TAKE 1 TABLET (20 MG TOTAL) BY MOUTH DAILY AS NEEDED (SWELLING OR WEIGHT GAIN). 12/20/23   Bailee Dong, NP   glucosamine-chondroitin 500-400 mg tablet Take 1 tablet by mouth 2 (two) times a day.    Provider, Historical   indomethacin (INDOCIN) 50 MG capsule Take 50 mg by mouth daily as needed.    Provider, Historical   levothyroxine (SYNTHROID) 50 MCG tablet TAKE 1 TABLET ONCE DAILY 12/19/23    Bailee Dong, NP   magnesium 250 mg Tab Take 1 tablet (250 mg total) by mouth every evening. 6/9/22   Kristen Villafana, MARIALUISA   montelukast (SINGULAIR) 10 mg tablet TAKE 1 TABLET EVERY EVENING 5/7/23   Bailee Dong, NP   naproxen (NAPROSYN) 500 MG tablet Take 1 tablet (500 mg total) by mouth 2 (two) times daily as needed (left lower back pain). 6/25/21   Sheldon Traore,    polyethylene glycol 400 (BLINK TEARS OPHT) Apply 1 drop to eye daily as needed.    Provider, Historical   potassium chloride (K-TAB) 20 mEq Take 1 tablet (20 mEq total) by mouth every morning. 2/23/24   Eloisa Louis MD   ramipriL (ALTACE) 10 MG capsule TAKE 1 CAPSULE TWICE DAILY 5/8/23   Kilo Maki III, MD SKYRIZI 150 mg/mL Syrg Inject into the skin. 1/27/23   Medical, First Priority   VITAMIN B COMPLEX (B COMPLEX ORAL) Take 1 tablet by mouth once daily.    Provider, Historical   zinc gluconate 50 mg tablet Take 50 mg by mouth once daily.    Provider, Historical       Review of Systems:  Review of Systems          PHYSICAL EXAM     There were no vitals taken for this visit.    GEN - A+OX4, NAD         LABS         ASSESSMENT/PLAN     Silvia Bernal is a 78 y.o. female with  1. Bilateral leg numbness  Discussed potential causes including possible spinal stenosis, peripheral neuropathy. Etc.   Willr eturn to podiatry to discuss  Continue with vitamin b12 supplement  Suggest alpha lipoic acid.   -     Cancel: Ambulatory referral/consult to Neurology; Future; Expected date: 04/19/2024  -     EMG W/ ULTRASOUND AND NERVE CONDUCTION TEST 1 Extremity; Future    2. Severe obesity (BMI 35.0-39.9) with comorbidity  Overview:  Encouraged safe weight loss via low calorie diet and exercise       3. Aortic atherosclerosis  Suggested the need for statin use.   Encouraged weight loss and heart healthy diet     4. Prediabetes  Long discussion re limiting carbs in diet     5. Fatigue, unspecified type    -     CBC Auto Differential;  Future; Expected date: 04/12/2024  -     Comprehensive Metabolic Panel; Future; Expected date: 04/12/2024  -     TSH; Future; Expected date: 04/12/2024    6. Hypercholesterolemia  Overview:  Cholesterol is very well controlled. Advised adding statin therapy   Limit cholesterol in diet .  Encouraged consistent cardio excercise    Orders:  -     Lipid Panel; Future; Expected date: 04/12/2024    7. Elevated fasting glucose  -     Hemoglobin A1C; Future; Expected date: 04/12/2024    8. Numbness and tingling  -     Vitamin B12; Future; Expected date: 04/12/2024    9. Hypothyroidism due to acquired atrophy of thyroid  Overview:  Tsh wnl  Continue current management  Asymptomatic     Orders:  -     levothyroxine (SYNTHROID) 50 MCG tablet; Take 1 tablet (50 mcg total) by mouth before breakfast.  Dispense: 90 tablet; Refill: 1           WORRY SCORE 2    RTC in 3 months, sooner if needed and depending on labs.    Eloisa Louis MD  Board Certified Internist/Geriatrician  [facility]

## 2024-04-15 ENCOUNTER — TELEPHONE (OUTPATIENT)
Dept: PHYSICAL MEDICINE AND REHAB | Facility: CLINIC | Age: 79
End: 2024-04-15
Payer: MEDICARE

## 2024-04-15 DIAGNOSIS — G62.9 NEUROPATHY: Primary | ICD-10-CM

## 2024-04-15 DIAGNOSIS — G95.89 OTHER SPECIFIED DISEASES OF SPINAL CORD: ICD-10-CM

## 2024-04-15 NOTE — TELEPHONE ENCOUNTER
So sorry I mustve ordered the wrong one. I'm looking for b/l LE. When it said on extremity I though that meant LE vs le and UE.

## 2024-04-16 PROBLEM — G62.9 NEUROPATHY: Status: ACTIVE | Noted: 2024-04-16

## 2024-04-17 ENCOUNTER — PATIENT MESSAGE (OUTPATIENT)
Dept: PRIMARY CARE CLINIC | Facility: CLINIC | Age: 79
End: 2024-04-17
Payer: MEDICARE

## 2024-04-18 ENCOUNTER — DOCUMENTATION ONLY (OUTPATIENT)
Dept: REHABILITATION | Facility: HOSPITAL | Age: 79
End: 2024-04-18
Payer: MEDICARE

## 2024-04-18 NOTE — PROGRESS NOTES
Goals: pt is wanting to lose some weight and increase her physicals activity    Limitation: next visit,     Subjective: pt lives far away so she is not able to commit to once a week; however she plans to come every week until I go out on maternity leave then will transition to every other week.      normal appearance

## 2024-04-29 ENCOUNTER — TELEPHONE (OUTPATIENT)
Dept: PRIMARY CARE CLINIC | Facility: CLINIC | Age: 79
End: 2024-04-29
Payer: MEDICARE

## 2024-05-02 ENCOUNTER — DOCUMENTATION ONLY (OUTPATIENT)
Dept: REHABILITATION | Facility: HOSPITAL | Age: 79
End: 2024-05-02
Payer: MEDICARE

## 2024-05-03 ENCOUNTER — TELEPHONE (OUTPATIENT)
Dept: PRIMARY CARE CLINIC | Facility: CLINIC | Age: 79
End: 2024-05-03
Payer: MEDICARE

## 2024-05-09 ENCOUNTER — DOCUMENTATION ONLY (OUTPATIENT)
Dept: REHABILITATION | Facility: HOSPITAL | Age: 79
End: 2024-05-09
Payer: MEDICARE

## 2024-05-09 NOTE — PROGRESS NOTES
Goals: pt is wanting to lose some weight and increase her physicals activity    Limitation: next visit,     Subjective: pt lives far away so she is not able to commit to once a week; however she plans to come every week until I go out on maternity leave then will transition to every other week.     Muscle group worked: lower body   Exercises: leg ext/curl, hip add/abd, sit 2 stand, high knees \, calf raises, torso rot

## 2024-05-13 NOTE — PROGRESS NOTES
Goals: pt is wanting to lose some weight and increase her physicals activity    Limitation: next visit,     Subjective: pt lives far away so she is not able to commit to once a week; however she plans to come every week until I go out on maternity leave then will transition to every other week.     Muscle group worked this week: upper body-low weight due to first exercising visit  Exercises: bicep curl, tri kick ext, chest press, sh press, row, wall push ups, st ball slams

## 2024-05-15 ENCOUNTER — TELEPHONE (OUTPATIENT)
Dept: PODIATRY | Facility: CLINIC | Age: 79
End: 2024-05-15
Payer: MEDICARE

## 2024-05-15 NOTE — TELEPHONE ENCOUNTER
----- Message from Samm Nelson sent at 5/15/2024  3:04 PM CDT -----  Regarding: advice  Contact: patient  Type: Needs Medical Advice  Who Called:  Patient   Symptoms (please be specific):    How long has patient had these symptoms:    Pharmacy name and phone #:    Best Call Back Number: [phone]  Additional Information: Pt stated that both of her feet are cold and tingling. Please call to advice. Thanks

## 2024-05-16 ENCOUNTER — DOCUMENTATION ONLY (OUTPATIENT)
Dept: REHABILITATION | Facility: HOSPITAL | Age: 79
End: 2024-05-16
Payer: MEDICARE

## 2024-05-16 NOTE — PROGRESS NOTES
Goals: pt is wanting to lose some weight and increase her physicals activity    Limitation:     Subjective: pt lives far away so she is not able to commit to once a week; however she plans to come every week until I go out on maternity leave then will transition to every other week. Pt gets pain and the working of muscles confused.     Muscle group worked: upper body(some weight increase on new exercises)   Exercises: hammer curl, alternating chest press, wall push ups, tri kickback, lat pull down, side bends, face pulls, sh press

## 2024-05-21 ENCOUNTER — DOCUMENTATION ONLY (OUTPATIENT)
Dept: REHABILITATION | Facility: HOSPITAL | Age: 79
End: 2024-05-21
Payer: MEDICARE

## 2024-05-21 NOTE — PROGRESS NOTES
Goals: pt is wanting to lose some weight and increase her physicals activity    Limitation: next visit,     Subjective: pt lives far away so she is not able to commit to once a week; however she plans to come every week until I go out on maternity leave then will transition to every other week.     Muscle group worked: lower body - no weight change   Exercises: leg ext/curl, hip add/abd, step ups (#3), kickbacks, calf raises,

## 2024-05-23 ENCOUNTER — OFFICE VISIT (OUTPATIENT)
Dept: PHYSICAL MEDICINE AND REHAB | Facility: CLINIC | Age: 79
End: 2024-05-23
Payer: MEDICARE

## 2024-05-23 DIAGNOSIS — R20.0 BILATERAL LEG NUMBNESS: Primary | ICD-10-CM

## 2024-05-23 DIAGNOSIS — G95.89 OTHER SPECIFIED DISEASES OF SPINAL CORD: ICD-10-CM

## 2024-05-23 DIAGNOSIS — G62.9 NEUROPATHY: ICD-10-CM

## 2024-05-23 PROCEDURE — 95910 NRV CNDJ TEST 7-8 STUDIES: CPT | Mod: PBBFAC,PO | Performed by: STUDENT IN AN ORGANIZED HEALTH CARE EDUCATION/TRAINING PROGRAM

## 2024-05-23 PROCEDURE — 95886 MUSC TEST DONE W/N TEST COMP: CPT | Mod: PBBFAC,PO | Performed by: STUDENT IN AN ORGANIZED HEALTH CARE EDUCATION/TRAINING PROGRAM

## 2024-05-23 PROCEDURE — 99499 UNLISTED E&M SERVICE: CPT | Mod: S$PBB,,, | Performed by: STUDENT IN AN ORGANIZED HEALTH CARE EDUCATION/TRAINING PROGRAM

## 2024-05-23 PROCEDURE — 95910 NRV CNDJ TEST 7-8 STUDIES: CPT | Mod: 26,S$PBB,, | Performed by: STUDENT IN AN ORGANIZED HEALTH CARE EDUCATION/TRAINING PROGRAM

## 2024-05-23 PROCEDURE — 95886 MUSC TEST DONE W/N TEST COMP: CPT | Mod: 26,S$PBB,, | Performed by: STUDENT IN AN ORGANIZED HEALTH CARE EDUCATION/TRAINING PROGRAM

## 2024-05-23 NOTE — PROGRESS NOTES
[facility]  [address] [facility] [address]  [place] [address]       Full Name: Silvia Bernal Gender: Female  MRN: [id number] YOB: 1945  History: Pain, Numbness, tingling, and cramps in BLE mostly on the left. Symptoms are felt up to her calves. (-) tinels at the ankles. Her primary complaint is an aching in her feet that is worse when she sits still for extended periods.   Visit Date: 5/23/2024 9:47 AM  Age: 78 Years  Examining Physician: Jared Woodard MD  Referring Physician:  Aileen Louis MD  Height: 4 feet 10 inch      Sensory NCS      Nerve / Sites Rec. Site Onset Lat Peak Lat NP Amp PP Amp Segments Distance Velocity     ms ms µV µV  cm m/s   L Sural - Ankle (Calf)      Calf Ankle 2.75 3.67 14.1 8.3 Calf - Ankle 14 51   R Sural - Ankle (Calf)      Calf Ankle 2.42 3.27 10.2 6.9 Calf - Ankle 14 58   L Superficial peroneal - Ankle      Lat leg Ankle 3.31 4.15 7.3 13.7 Lat leg - Ankle 14 42   R Superficial peroneal - Ankle      Lat leg Ankle 2.96 3.79 9.1 5.2 Lat leg - Ankle 14 47       Motor NCS      Nerve / Sites Muscle Latency Amplitude Amp % Duration Segments Distance Lat Diff Velocity     ms mV % ms  cm ms m/s   L Peroneal - EDB      Ankle EDB 3.67 7.5 100 5.67 Ankle - EDB 8        Fib head EDB 8.79 7.5 99.9 6.31 Fib head - Ankle 28 5.13 55      Pop fossa EDB 10.46 7.0 93.5 5.92 Pop fossa - Fib head 10 1.67 60   R Peroneal - EDB      Ankle EDB 2.92 8.1 100 6.50 Ankle - EDB 8        Fib head EDB 8.81 7.2 88.4 6.98 Fib head - Ankle 30 5.90 51      Pop fossa EDB 10.31 6.6 81.1 6.81 Pop fossa - Fib head 10 1.50 67   L Tibial - AH      Ankle AH 3.65 3.6 100 6.63 Ankle - AH 8        Pop fossa AH 11.27 3.3 93 7.40 Pop fossa - Ankle 30 7.63 39   R Tibial - AH      Ankle AH 4.06 5.5 100 6.67 Ankle - AH 8        Pop fossa AH 11.00 4.1 75.6 7.17 Pop fossa - Ankle 31 6.94 45       EMG Summary Table     Spontaneous MUAP Recruitment   Muscle IA Fib PSW Fasc CRD Amp Dur. Poly Pattern   L. Vastus  medialis N None None None None N N None N   R. Vastus medialis N None None None None N N None N   L. Tibialis anterior N None None None None N N None N   R. Tibialis anterior N None None None None N N None N   L. Tibialis posterior N None None None None N N None N   R. Tibialis posterior N None None None None N N None N   L. Peroneus longus N None None None None N N None N   R. Peroneus longus N None None None None N N None N   L. Gastrocnemius (Medial head) N None None None None N N None N   R. Gastrocnemius (Medial head) N None None None None N N None N       Summary    The motor conduction test was normal in all 4 of the tested nerves: L Peroneal - EDB, R Peroneal - EDB, L Tibial - AH, R Tibial - AH.    The sensory conduction test was normal in all 4 of the tested nerves: L Sural - Ankle (Calf), R Sural - Ankle (Calf), L Superficial peroneal - Ankle, R Superficial peroneal - Ankle.    The needle EMG study was normal in all 10 tested muscles: L. Vastus medialis, R. Vastus medialis, L. Tibialis anterior, R. Tibialis anterior, L. Tibialis posterior, R. Tibialis posterior, L. Peroneus longus, R. Peroneus longus, L. Gastrocnemius (Medial head), R. Gastrocnemius (Medial head).    Impression:    Normal study    EMG and nerve conduction study of both lower extremities was normal.      There was no electrodiagnostic evidence of lumbosacral radiculopathy, lumbosacral plexopathy, large fiber peripheral polyneuropathy or myopathy in the lower extremities.     Plan: Discussed results with patient. F/u with referring provider for additional management.     ------------------------------  Jared Woodard MD

## 2024-05-27 ENCOUNTER — TELEPHONE (OUTPATIENT)
Dept: PRIMARY CARE CLINIC | Facility: CLINIC | Age: 79
End: 2024-05-27
Payer: MEDICARE

## 2024-05-27 ENCOUNTER — HOSPITAL ENCOUNTER (OUTPATIENT)
Dept: RADIOLOGY | Facility: HOSPITAL | Age: 79
Discharge: HOME | End: 2024-05-27
Attending: INTERNAL MEDICINE
Payer: MEDICARE

## 2024-05-27 DIAGNOSIS — R20.0 NUMBNESS AND TINGLING: ICD-10-CM

## 2024-05-27 DIAGNOSIS — R20.2 NUMBNESS AND TINGLING: ICD-10-CM

## 2024-05-27 PROCEDURE — 93922 UPR/L XTREMITY ART 2 LEVELS: CPT | Mod: 26,,, | Performed by: STUDENT IN AN ORGANIZED HEALTH CARE EDUCATION/TRAINING PROGRAM

## 2024-05-27 PROCEDURE — 93922 UPR/L XTREMITY ART 2 LEVELS: CPT | Mod: TC,PO

## 2024-05-27 PROCEDURE — 93925 LOWER EXTREMITY STUDY: CPT | Mod: 26,,, | Performed by: STUDENT IN AN ORGANIZED HEALTH CARE EDUCATION/TRAINING PROGRAM

## 2024-05-27 NOTE — TELEPHONE ENCOUNTER
Pt called, has to go out of town. Pt  saw the physician to check circulation and she said she does not have a problem.     Pt is still having tingling and wants to know what she can do.     Please advise.

## 2024-05-27 NOTE — TELEPHONE ENCOUNTER
Did he not send her for nerve studies? If this is coming from her back , then for now lets follow up with them

## 2024-05-27 NOTE — TELEPHONE ENCOUNTER
Pt stated that she did not have the U/S done. She will have that done today at [facility]    Pt has had the EMG done. She sees cardiology on 6/18    She sees you next on 7/5 and will have labs drawn wee prior.     Pt stated that her compression stockings make her legs feel better, explained to keep wearing her compression stockings. Pt verbalized understanding.   She does not wear at night and when she start to ambulate in morning, her feet tingle. Explained to put on the stockings before she gets out of bed.

## 2024-05-28 DIAGNOSIS — Z00.00 ENCOUNTER FOR MEDICARE ANNUAL WELLNESS EXAM: ICD-10-CM

## 2024-05-28 DIAGNOSIS — I70.209 POPLITEAL ARTERY STENOSIS: Primary | ICD-10-CM

## 2024-05-28 RX ORDER — ROSUVASTATIN CALCIUM 20 MG/1
20 TABLET, COATED ORAL DAILY
Qty: 90 TABLET | Refills: 3 | Status: SHIPPED | OUTPATIENT
Start: 2024-05-28 | End: 2024-06-18

## 2024-05-28 NOTE — TELEPHONE ENCOUNTER
----- Message from Eloisa Louis MD sent at 5/28/2024  7:40 AM CDT -----  US suggests significant stenosis of the right leg. Id like her to be on a higher dose of statin, take a daily ASA and will refer to vascular to evaluate and discuss further   If agreeable to increase in statin dose, I  will increase it

## 2024-05-28 NOTE — PROGRESS NOTES
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 Id like her to be on a higher dose of statin, take a daily ASA and will refer to vascular to evaluate and discuss further   If agreeable to increase in statin dose, I  will increase it

## 2024-05-28 NOTE — TELEPHONE ENCOUNTER
Patient was informed of her us results. She verbalized understanding. Appt scheduled with vascular surgeon, Dr. Perkins.   Patient is agreeable to increasing her statin medication.

## 2024-06-04 ENCOUNTER — TELEPHONE (OUTPATIENT)
Dept: PRIMARY CARE CLINIC | Facility: CLINIC | Age: 79
End: 2024-06-04
Payer: MEDICARE

## 2024-06-04 NOTE — TELEPHONE ENCOUNTER
Left message for pt requesting a call back at earliest convenience. Patient needs labs scheduled prior to her  appt with Dr. Louis on 7/5/24

## 2024-06-13 ENCOUNTER — DOCUMENTATION ONLY (OUTPATIENT)
Dept: REHABILITATION | Facility: HOSPITAL | Age: 79
End: 2024-06-13
Payer: MEDICARE

## 2024-06-13 NOTE — PROGRESS NOTES
Goals: pt is wanting to lose some weight and increase her physicals activity    Limitation: next visit,     Subjective: pt lives far away so she is not able to commit to once a week; however she plans to come every week until I go out on maternity leave then will transition to every other week.     Muscle group worked: lower body - no weight change   Exercises: leg ext/curl, hip add/abd, step ups (#3), kickbacks, calf raises,   Goals: pt is wanting to lose some weight and increase her physicals activity    Limitation:     Subjective: pt lives far away so she is not able to commit to once a week; however she plans to come every week until I go out on maternity leave then will transition to every other week. Pt gets pain and the working of muscles confused.     Muscle group worked: upper body(some weight increase on new exercises)   Exercises: hammer curl, alternating chest press, wall push ups, tri kickback, lat pull down, side bends, face pulls, sh press

## 2024-06-18 ENCOUNTER — DOCUMENTATION ONLY (OUTPATIENT)
Dept: REHABILITATION | Facility: HOSPITAL | Age: 79
End: 2024-06-18
Payer: MEDICARE

## 2024-06-18 NOTE — PROGRESS NOTES
Goals: pt is wanting to lose some weight and increase her physicals activity    Limitation: next visit,     Subjective: pt lives far away so she is not able to commit to once a week; however she plans to come every week until I go out on maternity leave then will transition to every other week.     Muscle group worked: lower body - no weight change   Exercises: leg ext/curl, hip add/abd, step ups (#3), kickbacks, calf raises,   Goals: pt is wanting to lose some weight and increase her physicals activity        Subjective: pt lives far away so she is not able to commit to once a week; however she plans to come every week until I go out on maternity leave then will transition to every other week. Pt gets pain and the working of muscles confused.     Muscle group worked: upper body(some weight increase on new exercises)   Exercises: hammer curl, alternating chest press, wall push ups, tri kickback, lat pull down, side bends, face pulls, sh press    Lat pull downs, 1 curls, 2 wall pushups, 3 rows, tricep overheads, 4 overhead presses, 5 farmer carry,  side and front raises

## 2024-06-25 ENCOUNTER — DOCUMENTATION ONLY (OUTPATIENT)
Dept: REHABILITATION | Facility: HOSPITAL | Age: 79
End: 2024-06-25
Payer: MEDICARE

## 2024-06-25 NOTE — PROGRESS NOTES
Goals: pt is wanting to lose some weight and increase her physicals activity    Limitation: next visit,     Muscle group worked: lower body - no weight change   Exercises: leg ext/curl, hip add/abd, step ups (#3), kickbacks, calf raises,   Goals: pt is wanting to lose some weight and increase her physicals activity        Muscle group worked: upper body(some weight increase on new exercises)   Exercises: hammer curl, alternating chest press, wall push ups, tri kickback, lat pull down, side bends, face pulls, sh press    Lat pull downs, 1 curls, 2 wall pushups, 3 rows, tricep overheads, 4 overhead presses, 5 farmer carry,  side and front raises     Back kicks x, side kicks, toe raises x, wall squats x, sit to stands, good mornings x, side walks x

## 2024-06-27 ENCOUNTER — TELEPHONE (OUTPATIENT)
Dept: VASCULAR SURGERY | Facility: CLINIC | Age: 79
End: 2024-06-27
Payer: MEDICARE

## 2024-06-27 NOTE — TELEPHONE ENCOUNTER
Pt says she is unsure of when she will be able to make an appointment.       ----- Message from Chari Chi sent at 6/27/2024  2:12 PM CDT -----  Regarding: call back  Contact: pt  Type:  Patient Returning Call    Who Called:  patient  Who Left Message for Patient:  dorie   Does the patient know what this is regarding?:  referral  Best Call Back Number:  [phone]    Additional Information:  call back

## 2024-07-05 ENCOUNTER — OFFICE VISIT (OUTPATIENT)
Dept: PRIMARY CARE CLINIC | Facility: CLINIC | Age: 79
End: 2024-07-05
Payer: MEDICARE

## 2024-07-05 VITALS
TEMPERATURE: 98 F | SYSTOLIC BLOOD PRESSURE: 138 MMHG | HEIGHT: 58 IN | DIASTOLIC BLOOD PRESSURE: 78 MMHG | BODY MASS INDEX: 35.48 KG/M2 | OXYGEN SATURATION: 98 % | HEART RATE: 57 BPM | WEIGHT: 169 LBS

## 2024-07-05 DIAGNOSIS — I10 HYPERTENSION, ESSENTIAL: ICD-10-CM

## 2024-07-05 DIAGNOSIS — J30.1 SEASONAL ALLERGIC RHINITIS DUE TO POLLEN: ICD-10-CM

## 2024-07-05 DIAGNOSIS — R20.0 BILATERAL LEG NUMBNESS: Primary | ICD-10-CM

## 2024-07-05 DIAGNOSIS — M1A.9XX0 CHRONIC GOUT WITHOUT TOPHUS, UNSPECIFIED CAUSE, UNSPECIFIED SITE: ICD-10-CM

## 2024-07-05 DIAGNOSIS — I70.0 AORTIC ATHEROSCLEROSIS: ICD-10-CM

## 2024-07-05 DIAGNOSIS — R73.03 PREDIABETES: ICD-10-CM

## 2024-07-05 DIAGNOSIS — E03.9 ACQUIRED HYPOTHYROIDISM: ICD-10-CM

## 2024-07-05 DIAGNOSIS — E03.4 HYPOTHYROIDISM DUE TO ACQUIRED ATROPHY OF THYROID: ICD-10-CM

## 2024-07-05 DIAGNOSIS — E66.01 SEVERE OBESITY (BMI 35.0-39.9) WITH COMORBIDITY: ICD-10-CM

## 2024-07-05 DIAGNOSIS — G47.33 OSA ON CPAP: ICD-10-CM

## 2024-07-05 PROCEDURE — 99999 PR PBB SHADOW E&M-EST. PATIENT-LVL IV: CPT | Mod: PBBFAC,,, | Performed by: INTERNAL MEDICINE

## 2024-07-05 PROCEDURE — 99214 OFFICE O/P EST MOD 30 MIN: CPT | Mod: PBBFAC,PN | Performed by: INTERNAL MEDICINE

## 2024-07-05 NOTE — PROGRESS NOTES
"INTERNAL MEDICINE PROGRESS/URGENT CARE NOTE    CHIEF COMPLAINT     Chief Complaint   Patient presents with    Follow-up     Patient presents for her 6 month follow up appointment. Patient has no complaints at this time.       NORAH     Silvia Bernal is a 78 y.o.  female who presents with a PMHx of  history of Afib, HTN, HLD, IBS, SYLVAIN on CPAP, osteoporosis, thyroid nodule, ex-smoker (quit in 2015) who presents today to establish care.      Her main complaints and concerns are:   Weight control. Has lost about 7-8 pounds. Says she has been eating well.       At her previous visits, we discussed:   Allergies. Discussed treatment  numbness of both feet. Today, she describes unclear history to deliniate vascular vs nerve problem or could be both. First described that she has bumbness in her feet when she gets up in the am, and when she sits for too long like recently when she went to a show and sat there for over two hours. She has to stand for a while and let the "blood return to my legs before I can feel my feet and start walking" On further ROS, mentions that she also has to stop when she goes shopping with her friends due to jagruti in her legs. Reviewed the old arterial US and venous. Going to see her cardiologist shortly and says she will discuss this. Disc ussed doing a repeat arterial study for what sounds like claudication. Asked patient why she's not on a statin and I'm not sure what the answer was. Again unclear . Says her cardiologist asked her the same question and she replied "Well I like apple cider vinegar" . I've advised patient I would recommend statin use based on the review of her old arterial US LE.  Denies back pain. Says the numbness is episodic and usually occurs under the circumstances discussed above.   Was requesting to see neurologist which I agreed to but thinks she will see podiatry first whom she's seen last year for foot  pain diagnosed as tendonitis which improved with shoe inserts.  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 Surgeons office called her and stated "Pt says she is unsure of when she will be able to make an appointment. "  Had a nerve study done which she says was normal.      Anxiety: says her anxiety is well controlled. Says has lot of friends and social support   GAD7 score is 7/21     IBS: with both constipation/diarrhea. Mostly constipation     SYLVAIN: on CPAP which she reports she does use every night.       Home Medications:  Prior to Admission medications    Medication Sig Start Date End Date Taking? Authorizing Provider   allopurinoL (ZYLOPRIM) 100 MG tablet Take 2 tablets (200 mg total) by mouth once daily. 3/26/24   Eloisa Louis MD   ASPIRIN LOW DOSE ORAL Take 81 mg by mouth once daily. 6/9/15   Provider, Historical   azelastine (ASTELIN) 137 mcg (0.1 %) nasal spray 1 spray (137 mcg total) by Nasal route 2 (two) times daily. 12/28/23 12/27/24  Eloisa Louis MD   cholecalciferol, vitamin D3, (VITAMIN D3) 25 mcg (1,000 unit) capsule Take 3,000 Units by mouth once daily.    Provider, Historical   compress.stocking,knee,reg,med Misc 1 Pair by Misc.(Non-Drug; Combo Route) route once daily. 15-20 mmHg 6/14/22   Silvestre Nicholson APRN   diltiaZEM (CARDIZEM CD) 180 MG 24 hr capsule Take 1 capsule (180 mg total) by mouth once daily. 2/22/24 2/21/25  Kilo Maki III, MD   EMU OIL, BULK, MISC 1 applicator by Misc.(Non-Drug; Combo Route) route as needed.    Provider, Historical   estradiol (ESTRACE) 0.5 MG tablet Take 0.5 mg by mouth Every 3 (three) days.  9/3/15   Provider, Historical   furosemide (LASIX) 20 MG tablet TAKE 1 TABLET (20 MG TOTAL) BY MOUTH DAILY AS NEEDED (SWELLING OR WEIGHT GAIN). 12/20/23   Bailee Dong, NP   levothyroxine (SYNTHROID) 50 MCG tablet Take 1 tablet (50 mcg total) by mouth before breakfast. 4/12/24   Eloisa Louis MD   magnesium 250 mg Tab Take 1 tablet (250 mg total) by mouth every evening. 6/9/22   " Kristen Villafana, NP   montelukast (SINGULAIR) 10 mg tablet TAKE 1 TABLET EVERY EVENING 5/7/23   Bailee Dong, NP   potassium chloride (K-TAB) 20 mEq Take 1 tablet (20 mEq total) by mouth every morning. 2/23/24   Eloisa Louis MD   ramipriL (ALTACE) 10 MG capsule TAKE 1 CAPSULE TWICE DAILY 4/23/24   Kilo Maki III, MD SKYRIZI 150 mg/mL Syrg Inject into the skin. 1/27/23   Medical, [facility]   VITAMIN B COMPLEX (B COMPLEX ORAL) Take 1 tablet by mouth once daily.    Provider, Historical   zinc gluconate 50 mg tablet Take 50 mg by mouth once daily.    Provider, Historical       Review of Systems:  Review of Systems      Advance Care Planning     Date: 07/05/2024    Power of   I initiated the process of voluntary advance care planning today and explained the importance of this process to the patient.  I introduced the concept of advance directives to the patient, as well. Then the patient received detailed information about the importance of designating a Health Care Power of  (HCPOA). She was also instructed to communicate with this person about their wishes for future healthcare, should she become sick and lose decision-making capacity. The patient has previously appointed a HCPOA. After our discussion, the patient has decided to complete a HCPOA and has appointed her  friends , health care agent:  on file  & health care agent number:  on file . I encouraged her to communicate with this person about their wishes for future healthcare, should she become sick and lose decision-making capacity.      A total of 10 min was spent on advance care planning, goals of care discussion, emotional support, formulating and communicating prognosis and exploring burden/benefit of various approaches of treatment. This discussion occurred on a fully voluntary basis with the verbal consent of the patient and/or family.           PHYSICAL EXAM     /78 (BP Location: Left arm, Patient Position:  "Sitting, BP Method: Large (Manual))   Pulse (!) 57   Temp 98.4 °F (36.9 °C)   Ht 4' 10" (1.473 m)   Wt 76.6 kg (168 lb 15.7 oz)   SpO2 98%   BMI 35.32 kg/m²     GEN - A+OX4, NAD   HEENT - PERRL, EOMI, OP clear  Neck - No thyromegaly or cervical LAD. No thyroid masses felt.  CV - RRR, no m/r   Chest - CTAB, no wheezing or rhonchi  Abd - S/NT/ND/+BS.   Ext - 2+BDP and radial pulses. No C/C/E.  Skin - No rash.    LABS       ASSESSMENT/PLAN     Silvia Bernal is a 78 y.o. female with  1. Bilateral leg numbness  Unclear cause   Has ahd US legs. Has seen pain management to consider spinal source. Has had nerve study which she says is fine.     2. Severe obesity (BMI 35.0-39.9) with comorbidity  Overview:  Encouraged safe weight loss via low calorie diet and exercise       3. Aortic atherosclerosis  Was placed on a statin and says her cardiologist took her off. Patient to discuss with cardiologist  Encoruaged a high cholesterol diet    4. Prediabetes  Hgb A1c went from 6.2 to 5.6 which is great  Improved significantly    5. Hypothyroidism due to acquired atrophy of thyroid  Overview:  Tsh wnl  Continue current management  Asymptomatic       6. Seasonal allergic rhinitis due to pollen  Overview:  Discussed antihistamine and ICS  Avoid allergens       7. Hypertension, essential  Overview:  BP is well controlled  Continue current management  Limit salt in diet  Encouraged more consistent cardio excercise      8. Acquired hypothyroidism  Overview:  Tsh wnl  Continue current management  Asymptomatic       9. Chronic gout without tophus, unspecified cause, unspecified site  Overview:  Asymptomatic  Discussed limiting protein intake       10. SYLVAIN on CPAP  Encouraged compliant           WORRY SCORE 2    RTC in 3 months, sooner if needed and depending on labs.    Eloisa Louis MD  Board Certified Internist/Geriatrician  [facility] ([place])      "

## 2024-07-17 ENCOUNTER — TELEPHONE (OUTPATIENT)
Dept: PODIATRY | Facility: CLINIC | Age: 79
End: 2024-07-17
Payer: MEDICARE

## 2024-07-17 ENCOUNTER — TELEPHONE (OUTPATIENT)
Dept: PRIMARY CARE CLINIC | Facility: CLINIC | Age: 79
End: 2024-07-17
Payer: MEDICARE

## 2024-07-17 NOTE — TELEPHONE ENCOUNTER
Patient was informed of Dr. Louis's recs. Patient was encouraged to call back if the site does not improve. She verbalized understanding.

## 2024-07-17 NOTE — TELEPHONE ENCOUNTER
----- Message from Madisyn Menon sent at 7/17/2024  3:21 PM CDT -----  Contact: Self/ [phone]  [email or web address]     Patient is calling for Medical Advice regarding:bite     How long has patient had these symptoms:2 days     Pharmacy name and phone#: [id number] - [place] - [address]  [address]  [place] [address]  Phone: [phone] Fax: [phone]    Patient wants a call back or thru [facility]:    Comments:Symptom: Insect Bites  Outcome: Schedule an appointment to be seen within 24 hours.  Reason: Caller denied all higher acuity questions    The caller rejected this outcome, pt stated that she does not want an appt she would like advice on what to put on the bite       Please advise patient replies from provider may take up to 48 hours.

## 2024-07-17 NOTE — TELEPHONE ENCOUNTER
A little black bug the size of a grain of rice bit the patient the night before last. She wants to know what to put on it to stop the itch. She says the site is flat, pink and the size of a silver dollar.

## 2024-07-17 NOTE — TELEPHONE ENCOUNTER
Otc topical benadryl, an otc anti itch should work. Was she in the bushes (seriously, ie could It be a tick bite? ) if not then an otc ant itch medication should work.

## 2024-07-23 ENCOUNTER — OFFICE VISIT (OUTPATIENT)
Dept: PRIMARY CARE CLINIC | Facility: CLINIC | Age: 79
End: 2024-07-23
Payer: MEDICARE

## 2024-07-23 VITALS
WEIGHT: 167.88 LBS | TEMPERATURE: 98 F | BODY MASS INDEX: 35.09 KG/M2 | DIASTOLIC BLOOD PRESSURE: 78 MMHG | OXYGEN SATURATION: 98 % | HEART RATE: 66 BPM | SYSTOLIC BLOOD PRESSURE: 123 MMHG

## 2024-07-23 DIAGNOSIS — Z87.891 FORMER SMOKER: ICD-10-CM

## 2024-07-23 DIAGNOSIS — E66.01 SEVERE OBESITY (BMI 35.0-39.9) WITH COMORBIDITY: ICD-10-CM

## 2024-07-23 DIAGNOSIS — I10 HYPERTENSION, ESSENTIAL: ICD-10-CM

## 2024-07-23 DIAGNOSIS — I51.89 DIASTOLIC DYSFUNCTION: ICD-10-CM

## 2024-07-23 DIAGNOSIS — Z72.0 TOBACCO USE: ICD-10-CM

## 2024-07-23 DIAGNOSIS — I70.0 AORTIC ATHEROSCLEROSIS: ICD-10-CM

## 2024-07-23 DIAGNOSIS — Z00.00 ENCOUNTER FOR PREVENTIVE HEALTH EXAMINATION: Primary | ICD-10-CM

## 2024-07-23 DIAGNOSIS — I48.0 PAROXYSMAL ATRIAL FIBRILLATION: ICD-10-CM

## 2024-07-23 DIAGNOSIS — E03.4 HYPOTHYROIDISM DUE TO ACQUIRED ATROPHY OF THYROID: ICD-10-CM

## 2024-07-23 DIAGNOSIS — E78.00 HYPERCHOLESTEROLEMIA: ICD-10-CM

## 2024-07-23 DIAGNOSIS — R73.03 PREDIABETES: ICD-10-CM

## 2024-07-23 PROCEDURE — 99214 OFFICE O/P EST MOD 30 MIN: CPT | Mod: PBBFAC,PN,25 | Performed by: PHYSICIAN ASSISTANT

## 2024-07-23 PROCEDURE — 99497 ADVNCD CARE PLAN 30 MIN: CPT | Mod: S$PBB,33,, | Performed by: PHYSICIAN ASSISTANT

## 2024-07-23 PROCEDURE — G0439 PPPS, SUBSEQ VISIT: HCPCS | Mod: ,,, | Performed by: PHYSICIAN ASSISTANT

## 2024-07-23 PROCEDURE — 99999 PR PBB SHADOW E&M-EST. PATIENT-LVL IV: CPT | Mod: PBBFAC,,, | Performed by: PHYSICIAN ASSISTANT

## 2024-07-23 PROCEDURE — 99497 ADVNCD CARE PLAN 30 MIN: CPT | Mod: PBBFAC,PN | Performed by: PHYSICIAN ASSISTANT

## 2024-07-23 NOTE — PROGRESS NOTES
Silvia Bernal presented for a follow-up Medicare AWV today. The following components were reviewed and updated:    Medical history  Family History  Social history  Allergies and Current Medications  Health Risk Assessment  Health Maintenance  Care Team    **See Completed Assessments for Annual Wellness visit with in the encounter summary    The following assessments were completed:  Depression Screening (0)  Cognitive function Screening (5/5)  Timed Get Up Test (15 seconds)  Whisper Test (Normal, 500-4000 Hz)        Word recall 3/3    Opioid documentation:      Patient does not have a current opioid prescription.          Vitals:    07/23/24 0906   BP: 123/78   BP Location: Left arm   Patient Position: Sitting   BP Method: Large (Manual)   Pulse: 66   Temp: 98.3 °F (36.8 °C)   SpO2: 98%   Weight: 76.1 kg (167 lb 14.1 oz)     Body mass index is 35.09 kg/m².       Physical Exam  Vitals and nursing note reviewed.   Constitutional:       General: She is not in acute distress.     Appearance: Normal appearance. She is not ill-appearing.   HENT:      Head: Normocephalic and atraumatic.      Right Ear: External ear normal.      Left Ear: External ear normal.   Eyes:      General:         Right eye: No discharge.         Left eye: No discharge.      Extraocular Movements: Extraocular movements intact.      Conjunctiva/sclera: Conjunctivae normal.   Cardiovascular:      Rate and Rhythm: Normal rate and regular rhythm.   Pulmonary:      Effort: Pulmonary effort is normal. No respiratory distress.   Skin:     General: Skin is warm and dry.      Coloration: Skin is not jaundiced or pale.   Neurological:      General: No focal deficit present.      Mental Status: She is alert.   Psychiatric:         Mood and Affect: Mood normal.         Behavior: Behavior normal.         Thought Content: Thought content normal.         Judgment: Judgment normal.           Diagnoses and health risks identified today and associated  recommendations/orders:  1. Encounter for preventive health examination  AWV completed today  Needs low dose screening CT for lung cancer, but could not be ordered due to coverage issue. Will discuss with PCP at next visit.    2. Paroxysmal atrial fibrillation  Stable  Followed by Cardiology    3. Hypercholesterolemia  Stable  Followed by Cardiology and PCP    4. Hypertension, essential  Stable  Followed by Cardiology and PCP    5. Diastolic dysfunction  Stable  Followed by Cardiology    6. Aortic atherosclerosis  Stable  Followed by Cardiology    7. Hypothyroidism due to acquired atrophy of thyroid  Stable  Followed by PCP    8. Severe obesity (BMI 35.0-39.9) with comorbidity  Has lost about 10 lbs this year  Continue healthy diet and activity  Followed by PCP    9. Prediabetes  Last A1c 5.6, Improved  Followed by PCP      Evelin Vega with a 5-10 year written screening schedule and personal prevention plan. Recommendations were developed using the USPSTF age appropriate recommendations. Education, counseling, and referrals were provided as needed.  After Visit Summary printed and given to patient which includes a list of additional screenings\tests needed.    No follow-ups on file.      ISIDRO Hebert  I offered to discuss advanced care planning, including how to pick a person who would make decisions for you if you were unable to make them for yourself, called a health care power of , and what kind of decisions you might make such as use of life sustaining treatments such as ventilators and tube feeding when faced with a life limiting illness recorded on a living will that they will need to know. (How you want to be cared for as you near the end of your natural life)     X Patient is interested in learning more about how to make advanced directives.  I provided them paperwork and offered to discuss this with them.    Advance Care Planning     Date: 07/23/2024    Power of   I initiated the  process of voluntary advance care planning today and explained the importance of this process to the patient.  I introduced the concept of advance directives to the patient, as well. Then the patient received detailed information about the importance of designating a Health Care Power of  (HCPOA). She was also instructed to communicate with this person about their wishes for future healthcare, should she become sick and lose decision-making capacity. The patient has previously appointed a HCPOA. After our discussion, the patient has decided to complete a HCPOA and has appointed her  family member , health care agent:  Magaly Stone  & health care agent number:  [phone] . I encouraged her to communicate with this person about their wishes for future healthcare, should she become sick and lose decision-making capacity.      A total of 10 min was spent on advance care planning, goals of care discussion, emotional support, formulating and communicating prognosis and exploring burden/benefit of various approaches of treatment. This discussion occurred on a fully voluntary basis with the verbal consent of the patient and/or family.      Advance Care Planning     Date: 07/23/2024    Living Will  During this visit, I engaged the patient  in the voluntary advance care planning process.  The patient and I reviewed the role for advance directives and their purpose in directing future healthcare if the patient's unable to speak for him/herself.  At this point in time, the patient does have full decision-making capacity.  We discussed different extreme health states that she could experience, and reviewed what kind of medical care she would want in those situations.  The patient communicated that if she were comatose and had little chance of a meaningful recovery, she does not know if she wants machines/life-sustaining treatments used. In addition to the above preference, other important end-of-life issues for the  patient include  would like to discuss with  . .  I spent a total of 10 minutes engaging the patient in this advance care planning discussion.    I spent a total of 20 minutes discussing Advanced Care Planning with patient today.

## 2024-07-23 NOTE — PATIENT INSTRUCTIONS
Counseling and Referral of Other Preventative  (Italic type indicates deductible and co-insurance are waived)    Patient Name: Silvia Bernal  Today's Date: 7/23/2024    Health Maintenance       Date Due Completion Date    TETANUS VACCINE Never done ---    LDCT Lung Screen 07/15/2023 7/15/2022    COVID-19 Vaccine (5 - 2023-24 season) 09/01/2023 10/21/2022    Influenza Vaccine (1) 09/01/2024 11/14/2023    Override on 10/10/2019: Done    Hemoglobin A1c (Prediabetes) 07/01/2025 7/1/2024    Mammogram 06/17/2026 6/17/2024    Override on 10/1/2016: Done (per pt by her GYN)    Override on 10/1/2015: Done    DEXA Scan 06/17/2027 6/17/2024    Override on 10/1/2014: Done    Lipid Panel 07/01/2029 7/1/2024        No orders of the defined types were placed in this encounter.    The following information is provided to all patients.  This information is to help you find resources for any of the problems found today that may be affecting your health:                  Living healthy guide: www.[facility].[place].gov      Understanding Diabetes: www.diabetes.org      Eating healthy: www.cdc.gov/healthyweight      CDC home safety checklist: www.cdc.gov/steadi/patient.html      Agency on Aging: www.goea.[place].[facility]      Alcoholics anonymous (AA): www.aa.org      Physical Activity: www.francine.nih.gov/vp8tbpw      Tobacco use: www.quitwithusla.org

## 2024-07-29 ENCOUNTER — TELEPHONE (OUTPATIENT)
Dept: PRIMARY CARE CLINIC | Facility: CLINIC | Age: 79
End: 2024-07-29
Payer: MEDICARE

## 2024-07-29 DIAGNOSIS — R30.0 DYSURIA: Primary | ICD-10-CM

## 2024-07-29 RX ORDER — NITROFURANTOIN 25; 75 MG/1; MG/1
100 CAPSULE ORAL 2 TIMES DAILY
Qty: 10 CAPSULE | Refills: 0 | Status: SHIPPED | OUTPATIENT
Start: 2024-07-29 | End: 2024-08-03

## 2024-07-29 NOTE — TELEPHONE ENCOUNTER
Patient left a urine sample at the lab on [address] This morning. She is awaiting the results. She says her low back is hurting now.

## 2024-07-29 NOTE — TELEPHONE ENCOUNTER
I spoke with Denise at [facility] who said they have the patient's sample and it is being transported to the hospital for testing.

## 2024-07-29 NOTE — TELEPHONE ENCOUNTER
----- Message from ISIDRO Sosa sent at 7/29/2024  3:49 PM CDT -----  Please let patient know urine showed likely UTI. I sent Macrobid to her pharmacy.

## 2024-07-29 NOTE — TELEPHONE ENCOUNTER
Patient states she has bubbles in her urine. She is afraid that it is the beginning of a uti. Patient has no other symptoms. UA and urine culture ordered. Patient states she will go to the lab at [facility]

## 2024-07-30 NOTE — TELEPHONE ENCOUNTER
Bubbles commonly due to protein. Given her wieght loss goals I assume she is consuming more proteins. Will review lab when available. Not typically a sign of a UTI.   However, will await culture. UA with some blood.WILL review Urine culture its in process but In the meantime id like her to significantly increase her fluids

## 2024-07-31 NOTE — TELEPHONE ENCOUNTER
Can she wait to see how it clears up after the abx. Hydrate, and if persiste will send to nephrologist.

## 2024-07-31 NOTE — TELEPHONE ENCOUNTER
Patient states she is feeling about 80% better. I instructed her to drink more water. She verbalized understanding. She would like to see a urologist about her urine which she says is yellowish green and bubbly.

## 2024-08-01 ENCOUNTER — OFFICE VISIT (OUTPATIENT)
Dept: PODIATRY | Facility: CLINIC | Age: 79
End: 2024-08-01
Payer: MEDICARE

## 2024-08-01 VITALS — BODY MASS INDEX: 35.21 KG/M2 | HEIGHT: 58 IN | WEIGHT: 167.75 LBS

## 2024-08-01 DIAGNOSIS — R26.9 GAIT ABNORMALITY: Primary | ICD-10-CM

## 2024-08-01 PROCEDURE — 99999 PR PBB SHADOW E&M-EST. PATIENT-LVL IV: CPT | Mod: PBBFAC,,, | Performed by: STUDENT IN AN ORGANIZED HEALTH CARE EDUCATION/TRAINING PROGRAM

## 2024-08-01 PROCEDURE — 99214 OFFICE O/P EST MOD 30 MIN: CPT | Mod: PBBFAC,PO | Performed by: STUDENT IN AN ORGANIZED HEALTH CARE EDUCATION/TRAINING PROGRAM

## 2024-08-01 PROCEDURE — 99214 OFFICE O/P EST MOD 30 MIN: CPT | Mod: S$PBB,,, | Performed by: STUDENT IN AN ORGANIZED HEALTH CARE EDUCATION/TRAINING PROGRAM

## 2024-08-01 RX ORDER — AMOXICILLIN 500 MG/1
CAPSULE ORAL
COMMUNITY
Start: 2024-07-19

## 2024-08-01 NOTE — PROGRESS NOTES
Subjective:     Patient ID: Silvia Bernal is a 78 y.o. female.    Chief Complaint: Follow-up (Left leg predominately warm/cold, numbness, tingly, when standing cannot walk right away/Patient stated that she does not want the COVID shots.)  Patient presents to clinic for evaluation on the reason why she cannot walk immediately upon standing. It normally takes her about 10 seconds to start walking. She denies any pain. Admits to 2 year history of this issue.     Past Medical History:   Diagnosis Date    Anxiety disorder     Arthralgia     Atrial fibrillation     RESOLVED    Bruxism     Coagulation defect     Dizziness     LAWTON (dyspnea on exertion) 03/10/2016    Fatigue     History of chicken pox     Hyperglycemia     Hypertension, essential     Hypopotassemia     Irritable bowel syndrome (IBS)     Obesity     SYLVAIN on CPAP     Osteoporosis     Polycythemia, secondary     PONV (postoperative nausea and vomiting)     Thyroid nodule     Tobacco use, quit 4 yrs ago 11/06/2019    Weight gain        Past Surgical History:   Procedure Laterality Date    anterior hip replacement Left 06/27/2013    bone spur removed  10/2009    CARDIAC CATHETERIZATION  03.2009, 03/10/25156    HYSTERECTOMY  1999    INSERTION OF IMPLANTABLE LOOP RECORDER      JOINT REPLACEMENT      LEFT HEART CATHETERIZATION Left 6/6/2022    Procedure: Left heart cath;  Surgeon: Kilo Maki III, MD;  [facility];  Service: Cardiology;  Laterality: Left;    TOTAL ABDOMINAL HYSTERECTOMY W/ BILATERAL SALPINGOOPHORECTOMY  1999    half an ovary was removed    TOTAL HIP ARTHROPLASTY Bilateral 03/10/2011    tubial ligation         Family History   Problem Relation Name Age of Onset    Cirrhosis Mother      Heart disease Father      Hypertension Father      Hypertension Sister      Diabetes Sister      Hypertension Sister      Diabetes Sister      Hypertension Sister      Diabetes Sister      Hypertension Sister      Diabetes Maternal Grandmother          Social History     Socioeconomic History    Marital status:    Tobacco Use    Smoking status: Former     Current packs/day: 0.00     Average packs/day: 1 pack/day for 50.0 years (50.0 ttl pk-yrs)     Types: Cigarettes     Start date: 1968     Quit date: 2018     Years since quittin.7    Smokeless tobacco: Never    Tobacco comments:     social only at parties   Substance and Sexual Activity    Alcohol use: Yes     Alcohol/week: 0.0 standard drinks of alcohol     Comment: very occasionally    Drug use: No   Social History Narrative    ** Merged History Encounter **          Social Determinants of Health     Financial Resource Strain: Low Risk  (2024)    Overall Financial Resource Strain (CARDIA)     Difficulty of Paying Living Expenses: Not very hard   Food Insecurity: No Food Insecurity (2024)    Hunger Vital Sign     Worried About Running Out of Food in the Last Year: Never true     Ran Out of Food in the Last Year: Never true   Transportation Needs: No Transportation Needs (2024)    PRAPARE - Transportation     Lack of Transportation (Medical): No     Lack of Transportation (Non-Medical): No   Physical Activity: Inactive (2024)    Exercise Vital Sign     Days of Exercise per Week: 0 days     Minutes of Exercise per Session: 0 min   Stress: No Stress Concern Present (2024)    [place] [employer] - Occupational Stress Questionnaire     Feeling of Stress : Not at all   Housing Stability: Low Risk  (2024)    Housing Stability Vital Sign     Unable to Pay for Housing in the Last Year: No     Homeless in the Last Year: No       Current Outpatient Medications   Medication Sig Dispense Refill    allopurinoL (ZYLOPRIM) 100 MG tablet Take 2 tablets (200 mg total) by mouth once daily. 180 tablet 1    amoxicillin (AMOXIL) 500 MG capsule SMARTSI Capsule(s) By Mouth      ASPIRIN LOW DOSE ORAL Take 81 mg by mouth once daily.      azelastine  (ASTELIN) 137 mcg (0.1 %) nasal spray 1 spray (137 mcg total) by Nasal route 2 (two) times daily. 30 mL 0    compress.stocking,knee,reg,med Misc 1 Pair by Misc.(Non-Drug; Combo Route) route once daily. 15-20 mmHg 2 each 1    diltiaZEM (CARDIZEM CD) 180 MG 24 hr capsule Take 1 capsule (180 mg total) by mouth once daily. 30 capsule 11    EMU OIL, BULK, MISC 1 applicator by Misc.(Non-Drug; Combo Route) route as needed.      estradiol (ESTRACE) 0.5 MG tablet Take 0.5 mg by mouth Every 3 (three) days.       furosemide (LASIX) 20 MG tablet TAKE 1 TABLET (20 MG TOTAL) BY MOUTH DAILY AS NEEDED (SWELLING OR WEIGHT GAIN). 90 tablet 1    levothyroxine (SYNTHROID) 50 MCG tablet Take 1 tablet (50 mcg total) by mouth before breakfast. 90 tablet 1    magnesium 250 mg Tab Take 1 tablet (250 mg total) by mouth every evening.  0    montelukast (SINGULAIR) 10 mg tablet TAKE 1 TABLET EVERY EVENING 90 tablet 3    nitrofurantoin, macrocrystal-monohydrate, (MACROBID) 100 MG capsule Take 1 capsule (100 mg total) by mouth 2 (two) times daily. for 5 days 10 capsule 0    potassium chloride (K-TAB) 20 mEq Take 1 tablet (20 mEq total) by mouth every morning. 90 tablet 3    ramipriL (ALTACE) 10 MG capsule TAKE 1 CAPSULE TWICE DAILY 180 capsule 3    SKYRIZI 150 mg/mL Syrg Inject into the skin.      VITAMIN B COMPLEX (B COMPLEX ORAL) Take 1 tablet by mouth once daily.      zinc gluconate 50 mg tablet Take 50 mg by mouth once daily.      cholecalciferol, vitamin D3, (VITAMIN D3) 25 mcg (1,000 unit) capsule Take 3,000 Units by mouth once daily. (Patient not taking: Reported on 8/1/2024)       No current facility-administered medications for this visit.       Review of patient's allergies indicates:   Allergen Reactions    Phenergan [promethazine]     Adhesive tape-silicones      SURGICAL TAPE-ITCHY AND PEELS    Decongestant [cpm-phenyleph-acetaminophen]      palpitations    Fish containing products     Garlic     Mold     Msg [glutamic acid hcl]       Makes her sleepy    Unable to assess      Salt      Venom-honey bee      Bee Stings        Hemoglobin A1C   Date Value Ref Range Status   07/01/2024 5.6 0.0 - 5.6 % Final     Comment:     Reference Interval:  5.0 - 5.6 Normal   5.7 - 6.4 High Risk   > 6.5 Diabetic      Hgb A1c results are standardized based on the (NGSP) National   Glycohemoglobin Standardization Program.      Hemoglobin A1C levels are related to mean serum/plasma glucose   during the preceding 2-3 months.        05/25/2022 6.2 (H) 0.0 - 5.6 % Final     Comment:     Reference Interval:  5.0 - 5.6 Normal   5.7 - 6.4 High Risk   > 6.5 Diabetic      Hgb A1c results are standardized based on the (NGSP) National   Glycohemoglobin Standardization Program.      Hemoglobin A1C levels are related to mean serum/plasma glucose   during the preceding 2-3 months.        10/08/2021 6.0 (H) 0.0 - 5.6 % Final     Comment:     Reference Interval:  5.0 - 5.6 Normal   5.7 - 6.4 High Risk   > 6.5 Diabetic      Hgb A1c results are standardized based on the (NGSP) National   Glycohemoglobin Standardization Program.      Hemoglobin A1C levels are related to mean serum/plasma glucose   during the preceding 2-3 months.            Review of Systems   Constitutional: Negative for chills and fever.   Cardiovascular:  Negative for claudication and leg swelling.   Skin:  Negative for color change and nail changes.   Musculoskeletal:  Positive for myalgias. Negative for joint swelling, muscle cramps and muscle weakness.   Gastrointestinal:  Negative for nausea and vomiting.   Neurological:  Negative for numbness and paresthesias.   Psychiatric/Behavioral:  Negative for altered mental status.         Objective:     Physical Exam  Constitutional:       Appearance: Normal appearance. She is not ill-appearing.   Cardiovascular:      Pulses:           Dorsalis pedis pulses are 2+ on the right side and 2+ on the left side.        Posterior tibial pulses are 2+ on the right side and 2+  on the left side.      Comments: CFT is < 3 seconds bilateral.  Pedal hair growth is present bilateral.  No lower extremity edema noted bilateral.  Toes are warm to touch bilateral.     Musculoskeletal:         General: No tenderness, deformity or signs of injury.      Right lower leg: No edema.      Left lower leg: No edema.      Comments: Muscle strength 5/5 in all muscle groups bilateral.  No tenderness nor crepitation with ROM of foot/ankle joints bilateral.  (-) for pain with palpation to the extensor tendons, of bilateral foot, as they course along the midfoot.  Bilateral pes planus foot type.  Bilateral HAV.  Bilateral semi-reducible contracture of toes 2-5.     Skin:     General: Skin is warm and dry.      Capillary Refill: Capillary refill takes 2 to 3 seconds.      Findings: No bruising, ecchymosis, erythema, signs of injury, laceration, lesion, petechiae, rash or wound.      Comments: Pedal skin has normal turgor, temperature, and texture bilateral.  Toenails x 10 appear normotrophic. Examination of the skin reveals no evidence of significant maceration, rashes, open lesions, suspicious appearing nevi or other concerning lesions.       Neurological:      General: No focal deficit present.      Mental Status: She is alert.      Sensory: No sensory deficit.      Motor: No weakness or atrophy.      Comments: Light touch is intact bilateral.  (-) tinel sign bilateral.  Protective sensation is intact bilateral.             Assessment:      Encounter Diagnosis   Name Primary?    Gait abnormality Yes     Plan:     Silvia was seen today for follow-up.    Diagnoses and all orders for this visit:    Gait abnormality  -     Ambulatory referral/consult to Physical/Occupational Therapy; Future      I counseled the patient on her conditions, their implications and medical management.    We can address the neuropathy if needed; however, that is not her primary complaint. For the gait issue, I doubt it is from blood flow  or neuropathy. I recommend PT for gait retraining, strengthening, balance and mobility.    F/u with me as needed.

## 2024-08-02 ENCOUNTER — TELEPHONE (OUTPATIENT)
Dept: PRIMARY CARE CLINIC | Facility: CLINIC | Age: 79
End: 2024-08-02
Payer: MEDICARE

## 2024-08-02 DIAGNOSIS — R82.998 FROTHY URINE: Primary | ICD-10-CM

## 2024-08-02 NOTE — TELEPHONE ENCOUNTER
Patient called requesting something to help with her new RUQ back pain S/P UTI dx 7/29/24. Patient denied anymore burning with urination or urinary frequency since starting abx. Patient has been taking Tylenol with little relief.

## 2024-08-02 NOTE — TELEPHONE ENCOUNTER
Frothy urine can be protein, so id like her to do a 24 hour protein. And see a nephrologist.   Her urinalysis had absolutely no protein in it so maybe diet related.

## 2024-08-02 NOTE — TELEPHONE ENCOUNTER
Called patient and told her MD recommended the 24 hr urine. I told patient kit is at the front to , and patient said she will be by the clinic around 4p to  jugs.

## 2024-08-05 ENCOUNTER — LAB VISIT (OUTPATIENT)
Dept: LAB | Facility: HOSPITAL | Age: 79
End: 2024-08-05
Attending: INTERNAL MEDICINE
Payer: MEDICARE

## 2024-08-05 DIAGNOSIS — R82.998 FROTHY URINE: ICD-10-CM

## 2024-08-05 LAB
PROT 24H UR-MRATE: NORMAL MG/SPEC (ref 0–100)
PROT UR-MCNC: <7 MG/DL (ref 0–15)
URINE COLLECTION DURATION: 24 HR
URINE VOLUME: 3250 ML

## 2024-08-05 PROCEDURE — 84156 ASSAY OF PROTEIN URINE: CPT | Performed by: INTERNAL MEDICINE

## 2024-09-21 DIAGNOSIS — M10.472 OTHER SECONDARY ACUTE GOUT OF LEFT FOOT: ICD-10-CM

## 2024-09-23 RX ORDER — ALLOPURINOL 100 MG/1
200 TABLET ORAL
Qty: 180 TABLET | Refills: 1 | Status: SHIPPED | OUTPATIENT
Start: 2024-09-23

## 2024-10-01 ENCOUNTER — TELEPHONE (OUTPATIENT)
Dept: NEPHROLOGY | Facility: CLINIC | Age: 79
End: 2024-10-01
Payer: MEDICARE

## 2024-10-01 NOTE — TELEPHONE ENCOUNTER
Called and spoke to Patient. She requested a new patient appointment with Dr. More. She was informed that Dr. More isn't currently accepting new patients. She made an appointment with Dr. Asher for 10/30/24 at 1100.

## 2024-10-01 NOTE — TELEPHONE ENCOUNTER
----- Message from Nya sent at 10/1/2024  4:08 PM CDT -----  Contact: self  Type:  Sooner Appointment Request    Caller is requesting a sooner appointment.  Caller declined first available appointment listed below.  Caller will not accept being placed on the waitlist and is requesting a message be sent to doctor.    Name of Caller:  Patient  When is the first available appointment?  Feb  Symptoms:  Frothy urine  Would the patient rather a call back or a response via [facility] [facility]? Call back   Best Call Back Number:  [phone]    Additional Information:  Pt state she would like to daniel is concerned and has referral.Please call back

## 2024-10-30 ENCOUNTER — OFFICE VISIT (OUTPATIENT)
Dept: NEPHROLOGY | Facility: CLINIC | Age: 79
End: 2024-10-30
Payer: MEDICARE

## 2024-10-30 VITALS
DIASTOLIC BLOOD PRESSURE: 70 MMHG | OXYGEN SATURATION: 98 % | HEIGHT: 58 IN | WEIGHT: 167.75 LBS | HEART RATE: 64 BPM | BODY MASS INDEX: 35.21 KG/M2 | SYSTOLIC BLOOD PRESSURE: 126 MMHG

## 2024-10-30 DIAGNOSIS — R82.998 FROTHY URINE: Primary | ICD-10-CM

## 2024-10-30 PROCEDURE — 99214 OFFICE O/P EST MOD 30 MIN: CPT | Mod: PBBFAC,PN | Performed by: INTERNAL MEDICINE

## 2024-10-30 PROCEDURE — 99999 PR PBB SHADOW E&M-EST. PATIENT-LVL IV: CPT | Mod: PBBFAC,,, | Performed by: INTERNAL MEDICINE

## 2024-10-30 PROCEDURE — 99203 OFFICE O/P NEW LOW 30 MIN: CPT | Mod: S$PBB,,, | Performed by: INTERNAL MEDICINE

## 2024-10-30 RX ORDER — MICONAZOLE NITRATE 2 G/100G
POWDER TOPICAL
Qty: 85 G | Refills: 1 | Status: SHIPPED | OUTPATIENT
Start: 2024-10-30 | End: 2024-11-29

## 2024-11-22 ENCOUNTER — PATIENT MESSAGE (OUTPATIENT)
Dept: PODIATRY | Facility: CLINIC | Age: 79
End: 2024-11-22
Payer: MEDICARE

## 2024-12-19 ENCOUNTER — TELEPHONE (OUTPATIENT)
Dept: PRIMARY CARE CLINIC | Facility: CLINIC | Age: 79
End: 2024-12-19
Payer: MEDICARE

## 2024-12-19 DIAGNOSIS — R30.0 BURNING WITH URINATION: Primary | ICD-10-CM

## 2024-12-19 NOTE — TELEPHONE ENCOUNTER
Burning with urination and urinary frequency since this morning. Patient would like a UA and an antibiotic before the holidays.

## 2024-12-19 NOTE — TELEPHONE ENCOUNTER
Urine ordered. If she could go to the lab asap and drop off the urine that would give me results asap

## 2024-12-19 NOTE — TELEPHONE ENCOUNTER
----- Message from Nasima sent at 12/19/2024 12:15 PM CST -----  Type: Needs Medical Advice  Who Called:  pt  Symptoms (please be specific):  uti  How long has patient had these symptoms:  this morning   Pharmacy name and phone #:    [id number] - [place] - [address]  [address]  [place] [address]  Phone: [phone] Fax: [phone]      Best Call Back Number: [phone] (home) [phone] (work)    Additional Information: please advise

## 2024-12-20 PROBLEM — N39.0 URINARY TRACT INFECTION WITHOUT HEMATURIA: Status: ACTIVE | Noted: 2024-12-20

## 2024-12-24 ENCOUNTER — TELEPHONE (OUTPATIENT)
Dept: PRIMARY CARE CLINIC | Facility: CLINIC | Age: 79
End: 2024-12-24
Payer: MEDICARE

## 2024-12-24 DIAGNOSIS — R30.0 DYSURIA: Primary | ICD-10-CM

## 2024-12-24 RX ORDER — AMOXICILLIN AND CLAVULANATE POTASSIUM 875; 125 MG/1; MG/1
1 TABLET, FILM COATED ORAL 2 TIMES DAILY
Qty: 6 TABLET | Refills: 0 | Status: CANCELLED | OUTPATIENT
Start: 2024-12-24 | End: 2024-12-31

## 2024-12-24 NOTE — TELEPHONE ENCOUNTER
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 And if she will continue if  can send her new script for the remaining pills.      amoxicillin-clavulanate 875-125mg (AUGMENTIN) 875-125 mg per tablet    Send to :      [id number] - [place] - [address]  [address] [place] [address]  Phone: [phone] Fax: [phone]  Hours: Not open 24 hours        Lakeshia

## 2024-12-24 NOTE — TELEPHONE ENCOUNTER
Ms. Bernal started taking Augmentin on 12/20/24 for a UTI. After taking them for the first 4 days, she lost her abx yesterday. She is asking whether she needs another prescription for the remaining 3 days or if she can just do without taking the remaining 3 days.

## 2024-12-24 NOTE — TELEPHONE ENCOUNTER
5 days is actually appropriate treatment time. So 4.5 days is tricky. If her symptoms are improved, lets just observe without and see how she does. If symptoms return will refill then

## 2024-12-28 DIAGNOSIS — M10.472 OTHER SECONDARY ACUTE GOUT OF LEFT FOOT: ICD-10-CM

## 2024-12-28 DIAGNOSIS — E03.4 HYPOTHYROIDISM DUE TO ACQUIRED ATROPHY OF THYROID: ICD-10-CM

## 2024-12-30 ENCOUNTER — TELEPHONE (OUTPATIENT)
Dept: PRIMARY CARE CLINIC | Facility: CLINIC | Age: 79
End: 2024-12-30

## 2024-12-30 ENCOUNTER — OFFICE VISIT (OUTPATIENT)
Dept: PRIMARY CARE CLINIC | Facility: CLINIC | Age: 79
End: 2024-12-30
Payer: MEDICARE

## 2024-12-30 VITALS
BODY MASS INDEX: 35.96 KG/M2 | DIASTOLIC BLOOD PRESSURE: 88 MMHG | HEIGHT: 58 IN | HEART RATE: 60 BPM | OXYGEN SATURATION: 96 % | TEMPERATURE: 98 F | WEIGHT: 171.31 LBS | SYSTOLIC BLOOD PRESSURE: 170 MMHG

## 2024-12-30 DIAGNOSIS — I48.0 PAROXYSMAL ATRIAL FIBRILLATION: Primary | ICD-10-CM

## 2024-12-30 DIAGNOSIS — I10 HYPERTENSION, ESSENTIAL: ICD-10-CM

## 2024-12-30 DIAGNOSIS — R73.03 PREDIABETES: ICD-10-CM

## 2024-12-30 DIAGNOSIS — Z87.891 HISTORY OF NICOTINE USE: ICD-10-CM

## 2024-12-30 DIAGNOSIS — Z72.0 TOBACCO USE: ICD-10-CM

## 2024-12-30 DIAGNOSIS — G47.33 OSA ON CPAP: ICD-10-CM

## 2024-12-30 DIAGNOSIS — I51.89 DIASTOLIC DYSFUNCTION: ICD-10-CM

## 2024-12-30 DIAGNOSIS — E78.00 HYPERCHOLESTEROLEMIA: ICD-10-CM

## 2024-12-30 DIAGNOSIS — Z87.891 FORMER SMOKER: ICD-10-CM

## 2024-12-30 PROCEDURE — 99214 OFFICE O/P EST MOD 30 MIN: CPT | Mod: PBBFAC,PN | Performed by: INTERNAL MEDICINE

## 2024-12-30 PROCEDURE — 99214 OFFICE O/P EST MOD 30 MIN: CPT | Mod: S$PBB,,, | Performed by: INTERNAL MEDICINE

## 2024-12-30 PROCEDURE — 99999 PR PBB SHADOW E&M-EST. PATIENT-LVL IV: CPT | Mod: PBBFAC,,, | Performed by: INTERNAL MEDICINE

## 2024-12-30 RX ORDER — ALLOPURINOL 100 MG/1
200 TABLET ORAL
Qty: 180 TABLET | Refills: 1 | Status: SHIPPED | OUTPATIENT
Start: 2024-12-30

## 2024-12-30 RX ORDER — LEVOTHYROXINE SODIUM 50 UG/1
50 TABLET ORAL
Qty: 90 TABLET | Refills: 1 | Status: SHIPPED | OUTPATIENT
Start: 2024-12-30

## 2024-12-30 NOTE — PROGRESS NOTES
"INTERNAL MEDICINE PROGRESS/URGENT CARE NOTE    CHIEF COMPLAINT     Chief Complaint   Patient presents with    Follow-up     Patient is here for a 6 month follow up. She c/o vaginal itching.        HPI     Silvia Bernal is a 79 y.o.  female who presents with a PMHx of  history of Afib, HTN, HLD, IBS, SYLVAIN on CPAP, osteoporosis, thyroid nodule, ex-smoker (quit in 2015) who presents today for routine follow up care.      Her main complaints and concerns are:   Recently had a UTI, treated.         At her previous visits, we discussed:   Allergies. Discussed treatment  numbness of both feet. Today, she describes unclear history to deliniate vascular vs nerve problem or could be both. First described that she has bumbness in her feet when she gets up in the am, and when she sits for too long like recently when she went to a show and sat there for over two hours. She has to stand for a while and let the "blood return to my legs before I can feel my feet and start walking" On further ROS, mentions that she also has to stop when she goes shopping with her friends due to jagruti in her legs. Reviewed the old arterial US and venous. Going to see her cardiologist shortly and says she will discuss this. Disc ussed doing a repeat arterial study for what sounds like claudication. Asked patient why she's not on a statin and I'm not sure what the answer was. Again unclear . Says her cardiologist asked her the same question and she replied "Well I like apple cider vinegar" . I've advised patient I would recommend statin use based on the review of her old arterial US LE.  Denies back pain. Says the numbness is episodic and usually occurs under the circumstances discussed above.   Was requesting to see neurologist which I agreed to but thinks she will see podiatry first whom she's seen last year for foot  pain diagnosed as tendonitis which improved with shoe inserts.  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 " "Surgeons office called her and stated "Pt says she is unsure of when she will be able to make an appointment. "  Had a nerve study done which she says was normal.  3.  Weight control. Has lost about 7-8 pounds. Says she has been eating well.      Anxiety: says her anxiety is well controlled. Says has lot of friends and social support   GAD7 score is 7/21     IBS: with both constipation/diarrhea. Mostly constipation     SYLVAIN: on CPAP which she reports she does use every night.     Prediabetes: Previous hgb A1c 6.2, now doen to 5.6 with wieght loss and improved diet.       Home Medications:  Prior to Admission medications    Medication Sig Start Date End Date Taking? Authorizing Provider   allopurinoL (ZYLOPRIM) 100 MG tablet TAKE 2 TABLETS BY MOUTH ONCE DAILY 9/23/24   Eloisa Louis MD   ASPIRIN LOW DOSE ORAL Take 81 mg by mouth once daily. 6/9/15   Provider, Historical   azelastine (ASTELIN) 137 mcg (0.1 %) nasal spray 1 spray (137 mcg total) by Nasal route 2 (two) times daily. 12/28/23 12/27/24  Eloisa Louis MD   cholecalciferol, vitamin D3, (VITAMIN D3) 25 mcg (1,000 unit) capsule Take 3,000 Units by mouth once daily.    Provider, Historical   compress.stocking,knee,reg,med Misc 1 Pair by Misc.(Non-Drug; Combo Route) route once daily. 15-20 mmHg 6/14/22   Silvestre Nicholson APRN   diltiaZEM (CARDIZEM CD) 180 MG 24 hr capsule Take 1 capsule (180 mg total) by mouth once daily. 2/22/24 2/21/25  Kilo Maki III, MD   EMU OIL, BULK, MISC 1 applicator by Misc.(Non-Drug; Combo Route) route as needed.    Provider, Historical   estradiol (ESTRACE) 0.5 MG tablet Take 0.5 mg by mouth Every 3 (three) days.  9/3/15   Provider, Historical   furosemide (LASIX) 20 MG tablet TAKE 1 TABLET (20 MG TOTAL) BY MOUTH DAILY AS NEEDED (SWELLING OR WEIGHT GAIN). 12/20/23   Bailee Dong, NP   levothyroxine (SYNTHROID) 50 MCG tablet Take 1 tablet (50 mcg total) by mouth before breakfast. 4/12/24   Eloisa Louis" "MD MICHELLE   magnesium 250 mg Tab Take 1 tablet (250 mg total) by mouth every evening. 6/9/22   Kristen Villafana, MARIALUISA   miconazole NITRATE 2 % (MICOTIN) 2 % top powder Apply topically as needed for Itching. 10/30/24 11/29/24  Jihan Asher MD   montelukast (SINGULAIR) 10 mg tablet TAKE 1 TABLET EVERY EVENING 9/12/24   Eloisa Louis MD   potassium chloride (K-TAB) 20 mEq Take 1 tablet (20 mEq total) by mouth every morning. 2/23/24   Eloisa Louis MD   ramipriL (ALTACE) 10 MG capsule TAKE 1 CAPSULE TWICE DAILY 4/23/24   Kilo Maki III, MD SKYRIZI 150 mg/mL Syrg Inject into the skin. 1/27/23   Medical, [facility]   VITAMIN B COMPLEX (B COMPLEX ORAL) Take 1 tablet by mouth once daily.    Provider, Historical   zinc gluconate 50 mg tablet Take 50 mg by mouth once daily.    Provider, Historical       Review of Systems:  Review of Systems      Advance Care Planning                  PHYSICAL EXAM     BP (!) 170/88 (BP Location: Left arm, Patient Position: Sitting)   Pulse 60   Temp 98.2 °F (36.8 °C) (Oral)   Ht 4' 10" (1.473 m)   Wt 77.7 kg (171 lb 4.8 oz)   SpO2 96%   BMI 35.80 kg/m²     GEN - A+OX4, NAD   HEENT - PERRL, EOMI, OP clear  Neck - No thyromegaly or cervical LAD. No thyroid masses felt.  CV - RRR, no m/r   Chest - CTAB, no wheezing or rhonchi  Abd - S/NT/ND/+BS.   Ext - 2+BDP and radial pulses. No C/C/E.  Skin - No rash.    LABS         ASSESSMENT/PLAN     Silvia Bernal is a 79 y.o. female with  1. Paroxysmal atrial fibrillation  Asymptomatic.     2. Hypercholesterolemia  Overview:  Cholesterol is very well controlled. Continue current management   Limit cholesterol in diet .  Encouraged consistent cardio excercise      3. Hypertension, essential  Overview:  BP is well controlled  Continue current management  Limit salt in diet  Encouraged more consistent cardio excercise      4. Diastolic dysfunction  Asymptomatic     5. SYLVAIN on CPAP  Compliant  Sleeps well     6. Tobacco " use, quit 4 yrs ago  Encouaraged adherence     7. Former smoker  Has since stopped     8. Prediabetes  Improved with diet           WORRY SCORE 1    RTC in 6 months, sooner if needed and depending on labs.    Eloisa Louis MD  Board Certified Internist/Geriatrician  [facility] ([place])

## 2024-12-30 NOTE — TELEPHONE ENCOUNTER
Patient called to say that after taking her blood pressure medication a couple of hours ago, her blood pressure is now 180/77. She wants to know if Dr. Louis wants her to take an extra dose of her ramipril 10mg or Dlitiazem 180mg. Also, should she be taking her lasix 20mg. It is currently PRN swelling or weight gain.

## 2024-12-31 ENCOUNTER — CLINICAL SUPPORT (OUTPATIENT)
Dept: PRIMARY CARE CLINIC | Facility: CLINIC | Age: 79
End: 2024-12-31
Payer: MEDICARE

## 2024-12-31 VITALS
HEART RATE: 60 BPM | DIASTOLIC BLOOD PRESSURE: 70 MMHG | SYSTOLIC BLOOD PRESSURE: 134 MMHG | OXYGEN SATURATION: 96 % | TEMPERATURE: 98 F

## 2024-12-31 DIAGNOSIS — I10 HYPERTENSION, ESSENTIAL: Primary | ICD-10-CM

## 2024-12-31 PROCEDURE — 99999 PR PBB SHADOW E&M-EST. PATIENT-LVL III: CPT | Mod: PBBFAC,,,

## 2024-12-31 PROCEDURE — 99213 OFFICE O/P EST LOW 20 MIN: CPT | Mod: PBBFAC,PN

## 2024-12-31 NOTE — PROGRESS NOTES
BP: 134/70 , Pulse: 60    Blood pressure reading after 15 minutes was 134/70, Pulse 60.  Dr. Louis notified.    Silvia Bernal 79 y.o. female is here today for Blood Pressure check.   History of HTN yes.    Review of patient's allergies indicates:   Allergen Reactions    Phenergan [promethazine]     Adhesive tape-silicones      SURGICAL TAPE-ITCHY AND PEELS    Decongestant [cpm-phenyleph-acetaminophen]      palpitations    Fish containing products     Garlic     Mold     Msg [glutamic acid hcl]      Makes her sleepy    Unable to assess      Salt      Venom-honey bee      Bee Stings     Creatinine   Date Value Ref Range Status   12/19/2024 0.69 0.50 - 1.40 mg/dL Final   09/05/2015 0.71 0.52 - 1.04 MG/DL Final     Sodium   Date Value Ref Range Status   12/19/2024 139 136 - 145 mmol/L Final   09/05/2015 139 137 - 145 MMOL/L Final     Potassium   Date Value Ref Range Status   12/19/2024 4.1 3.5 - 5.1 mmol/L Final     Comment:     Anion Gap reference range revised on 4/28/2023 09/05/2015 4.5 3.5 - 5.1 MMOL/L Final   ]  Patient denies taking blood pressure medications on a regular basis at the same time of the day.     Current Outpatient Medications:     allopurinoL (ZYLOPRIM) 100 MG tablet, TAKE 2 TABLETS BY MOUTH ONCE DAILY, Disp: 180 tablet, Rfl: 1    ASPIRIN LOW DOSE ORAL, Take 81 mg by mouth once daily., Disp: , Rfl:     cholecalciferol, vitamin D3, (VITAMIN D3) 25 mcg (1,000 unit) capsule, Take 3,000 Units by mouth once daily., Disp: , Rfl:     compress.stocking,knee,reg,med Misc, 1 Pair by Misc.(Non-Drug; Combo Route) route once daily. 15-20 mmHg, Disp: 2 each, Rfl: 1    diltiaZEM (CARDIZEM CD) 180 MG 24 hr capsule, Take 1 capsule (180 mg total) by mouth once daily., Disp: 30 capsule, Rfl: 11    EMU OIL, BULK, MISC, 1 applicator by Misc.(Non-Drug; Combo Route) route as needed., Disp: , Rfl:     estradiol (ESTRACE) 0.5 MG tablet, Take 0.5 mg by mouth Every 3 (three) days. , Disp: , Rfl:     furosemide  (LASIX) 20 MG tablet, TAKE 1 TABLET (20 MG TOTAL) BY MOUTH DAILY AS NEEDED (SWELLING OR WEIGHT GAIN)., Disp: 90 tablet, Rfl: 1    levothyroxine (SYNTHROID) 50 MCG tablet, TAKE 1 TABLET BY MOUTH BEFORE BREAKFAST., Disp: 90 tablet, Rfl: 1    magnesium 250 mg Tab, Take 1 tablet (250 mg total) by mouth every evening., Disp: , Rfl: 0    montelukast (SINGULAIR) 10 mg tablet, TAKE 1 TABLET EVERY EVENING, Disp: 90 tablet, Rfl: 3    potassium chloride (K-TAB) 20 mEq, Take 1 tablet (20 mEq total) by mouth every morning., Disp: 90 tablet, Rfl: 3    ramipriL (ALTACE) 10 MG capsule, TAKE 1 CAPSULE TWICE DAILY, Disp: 180 capsule, Rfl: 3    SKYRIZI 150 mg/mL Syrg, Inject into the skin., Disp: , Rfl:     VITAMIN B COMPLEX (B COMPLEX ORAL), Take 1 tablet by mouth once daily., Disp: , Rfl:     zinc gluconate 50 mg tablet, Take 50 mg by mouth once daily., Disp: , Rfl:     azelastine (ASTELIN) 137 mcg (0.1 %) nasal spray, 1 spray (137 mcg total) by Nasal route 2 (two) times daily., Disp: 30 mL, Rfl: 0    miconazole NITRATE 2 % (MICOTIN) 2 % top powder, Apply topically as needed for Itching., Disp: 85 g, Rfl: 1  Does patient have record of home blood pressure readings yes. Readings have been averaging 175/81. Patient's home blood pressure machine read 20 points higher than the manual reading. Patient was asked to track blood pressure for 2 weeks and come back for another nurse visit blood pressure check on 1/14/25.  Last dose of blood pressure medication was taken at 9:30.  Patient is asymptomatic.   Patient's home blood pressure machine read 20 points higher than the manual reading.

## 2024-12-31 NOTE — TELEPHONE ENCOUNTER
I spoke with the patient who stated her blood pressure today is 167/92. She is going to purchase a new blood pressure machine and bring it to the clinic for calibration. I scheduled her for a nurse visit at 11:15.

## 2024-12-31 NOTE — TELEPHONE ENCOUNTER
Can you check with her today re blood pressure. Unsure why her bp would be just as high after she takes her meds. So today, lets ensure she has taken her meds. Check bp and see what it is. She needs to significantly limit all the salty holiday foods she's been eating. If over 160, may take half the ramipril but lets fixed the main issue which is salt intake and monitor BP

## 2025-01-03 ENCOUNTER — TELEPHONE (OUTPATIENT)
Dept: PRIMARY CARE CLINIC | Facility: CLINIC | Age: 80
End: 2025-01-03
Payer: MEDICARE

## 2025-01-03 NOTE — TELEPHONE ENCOUNTER
If consistent elevated, may take half the ramipril.no not take extra diltiazem as its also a heart rate medication. Ramipril take half and then take the second half if bp doesn't go down in 30 mins -1 hour

## 2025-01-03 NOTE — TELEPHONE ENCOUNTER
Patient called stating she has the flu and that her blood pressure is elevated. She is taking 223 points off the readings from her home machine and her blood pressure has been 153/79 and 181/79. She wants to know if she should take an additional ramipril 10mg or diltiazem 180mg to bring her bp down.

## 2025-01-03 NOTE — TELEPHONE ENCOUNTER
Patient was informed of Dr. Louis's recs. She verbalized understanding. Check on patient next week.

## 2025-01-10 ENCOUNTER — TELEPHONE (OUTPATIENT)
Dept: PRIMARY CARE CLINIC | Facility: CLINIC | Age: 80
End: 2025-01-10
Payer: MEDICARE

## 2025-01-10 NOTE — TELEPHONE ENCOUNTER
----- Message from Lakeshia sent at 1/10/2025  2:40 PM CST -----  Regarding: pt advice- not feeling better  [phone]- call back ; not feeling much better ; still dripping and congestion ; need different medication      Send to ;      [id number] - [place] - [address]  [address] [place] [address]  Phone: [phone] Fax: [phone]  Hours: Not open 24 hours        Lakeshia

## 2025-01-10 NOTE — TELEPHONE ENCOUNTER
Called patient and she denied fever, but is still having clear mucus discharge and dry cough. Patient has been taking Coricidin, Emergen-C liquid and chloraseptic spray, but is not on an antihistamine or using a nasal spray. I got with Dr Louis and she recommended patient start OTC Claritin and Flonase, then follow up next week if symptoms persist or worsen. Patient voiced understanding and did not have questions.   Chief complaint:   Chief Complaint   Patient presents with   • Follow-up       Vitals:  Visit Vitals  /62 (BP Location: RUE - Right upper extremity, Patient Position: Sitting, Cuff Size: Large Adult)   Pulse 80   Ht 5' 7\" (1.702 m)   Wt 64.9 kg   SpO2 98%   BMI 22.40 kg/m²       HISTORY OF PRESENT ILLNESS     HPI  Patient presents for hospital follow-up visit.    DC summary:  \" SURGERY DISCHARGE SUMMERY        Date of Admission:  1/2/2020  Date of Discharge:  1/6/2020  Procedure Performed:  1.  Low anterior resection with low coloproctostomy abdominal transanal approach.  2.  Flexible sigmoidoscopy  3. Drainage of intra abdominal abscess  4.  Bladder repair of cystorrhaphy.    5.  Intramuscular transverse abdominis block    Date of Surgery: 1/2/20  Hospital Course:  Patient is admitted to the surgical service and underwent the above-noted procedure.  Postoperative course was marked by early and aggressive pulmonary cares, ambulation, DVT (deep vein thrombosis) prophylaxis and 24 hours of ABX (antibiotic) therapy.  Postoperative labs and VS (vital signs) remained at expected limits.  Bowels began to function and diet was advanced, which patient tolerated without difficulty.  Incision is healing without infection; abdomen is benign, pain well controlled with oral analgesics.  Patient continues with generalized weakness requiring physical therapy. He is deemed medically appropriate for discharge to SNF  with above noted medications and f/u (follow up).\"    He was in [facility] rehab until 1/13/20 and now is back home.   He is doing well, incisions is healing, surgical follow up 1/162020.   BMs NL.   Ambulation is back to baseline, he walks without walker or cane.     He has DMV form he asks to fill out, per police report he was driving on [address]bound on eastbound side of road. He explained to police that he had earlier passed another car in his way and did not return to his rocío due to some  What Type Of Note Output Would You Prefer (Optional)?: Bullet Format How Severe Is Your Skin Lesion?: mild obstacle. He reports that he did not have and does not have any problems with driving, vehicle control or his vision. He denies memory problems, problems with navigation, problems getting loss. He denies side effects of medications, reports that he uses tramadol only at bed time for OA pain.         Hx laparoscopic repair of colovesicular fistula, bladder repair low anterior resection with low coloproctostomy abdominal transanal approachon 1/2/2020. Dr Mitchell Paz.     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 GI consult follows.       ...  HTN. BP improved, runs in 105-120's at home. He checks it in [employer] store. White coat HTN.   Hx murmur.   Moderate-severe MR. S/p Cardiology consult. As long as pt is asymptomatic and no significant change in LVF, conservative rx was advised.   RBBB and LAFB since 8/2019, not symptomatic. No syncope or near-syncope.     B leg edema, onset after AAA surgery, persists, worse on L leg. L leg varicose veins. S/p vascular clinic follow up, s/p negative venous doppler 2017. contiue Lasix 20 mg 1 daily. Daily compression stockings advised, he does not wear.   Increased leg edema x 1 month. Repeat Venous doppler advised asap.      Hx B iliac aneurysm and AAA, s/p endovascular repair 8/2015. Dr Philip Barajas.   B cold feet, tingling in B feet, diminished DP pulse on L. GORDON requested.     Hx L carotid artery stenosis on doppler, 50-69% on doppler 7/2019. Vascular consult follows.   FHx CVA (mother).    Arteriosclerotic vascular disease of the legs.     Dyslipidemia, Hx low HDL. Normalized.   Glucose intolerance, normalized with diet.   Obesity. BMI 31. Wt 172 lbs. Wt loss advised.     Anemia. Iron deficiency. advised Fe sulfate to 325 mg bid. Monitor.     CRI. Cr 1.46-2.1. Dr Still follows. Kidney Bx was  Has Your Skin Lesion Been Treated?: not been treated done.   Hx ARF on CRF, Cr returned to baseline.   FHx ESRD (father).     Hx UTI, resolved.    Urinary incontinence, urinary frequency. Urology follows, Dr Redd.   Benign prostatic hypertrophy with benign biopsies. Urines and PSAs followed by urology at [facility].   Hx elevated PSA. 7.12. Last was 4.82 in 2012.     Hx R inguinal hernia, noted by self exam by patient. No pain. Advised to avoid straining, monitor.     Hx extensive diverticulosis.  Esophageal reflux with Guerrier's esophagitis. on Omeprazole.   Hx adenomatous polyps, last colonoscopy 1/2015, repeat advised 1/2020.   Internal and external hemorrhoids.   Status post hemorrhoidectomy.   Colonoscopy 1/2015: \"6 mm sessile transverse colon polyp removed completely using snare cautery. Mild diverticulosis of the entire colon. Small internal and external hemorrhoids\"    R hand OA, pain improved now with tylenol. He defer hand Ortho consult for now. RF negative.   Hip OA, pain in B hips, R>L, pain is worse with standing, walking. Use of cane helps. No radiation into lower leg, no numbness .    LS djd. Hx R LBP, improved with prn tramadol.    Status post cigarette usage with Hx chronic obstructive pulmonary disease as per Dr Nassar note. No active symptoms.            Other significant problems:  Patient Active Problem List    Diagnosis Date Noted   • Malignant neoplasm of ureteric orifice (CMS/HCC) 01/11/2020     Priority: Low   • Dyslipidemia 01/11/2020     Priority: Low   • Generalized muscle weakness 01/11/2020     Priority: Low   • Malignant neoplasm (CMS/[facility])      Priority: Low     bladder     • Right inguinal hernia 04/18/2018     Priority: Low   • Iron deficiency anemia 04/12/2017     Priority: Low   • Chronic renal impairment, stage 3 (moderate) (CMS/HCC) 04/12/2017     Priority: Low   • Anemia 01/11/2017     Priority: Low   • Obesity (BMI 30-39.9) 04/26/2016     Priority: Low   • Swelling of limb 01/07/2016     Priority: Low   • Prostatitis  01/04/2016     Priority: Low   • Mitral valve insufficiency 12/30/2015     Priority: Low   • Bilateral leg edema 12/30/2015     Priority: Low   • UTI (urinary tract infection) 11/12/2015     Priority: Low   • Lower urinary tract symptoms (LUTS) 11/02/2015     Priority: Low   • Frequency 09/21/2015     Priority: Low   • S/P AAA (abdominal aortic aneurysm) repair 09/15/2015     Priority: Low   • Acute urinary retention 09/10/2015     Priority: Low   • Diverticulosis 03/24/2015     Priority: Low   • Tubular adenoma of colon 03/24/2015     Priority: Low   • Elevated prostate specific antigen (PSA) 01/07/2015     Priority: Low   • Proteinuria 08/13/2014     Priority: Low   • Chronic kidney disease, stage III (moderate) (CMS/[facility]) 08/13/2014     Priority: Low   • HTN (hypertension) 07/01/2014     Priority: Low   • Carotid artery stenosis 07/01/2014     Priority: Low   • Lateral malleolar fracture 02/21/2014     Priority: Low   • Chronic venous insufficiency 01/28/2014     Priority: Low   • GERD (esophageal reflux)      Priority: Low   • Colon Polyp      Priority: Low   • Glucose intolerance (impaired glucose tolerance)      Priority: Low   • COPD (chronic obstructive pulmonary disease) (CMS/HCC)      Priority: Low   • Sciatica 04/24/2009     Priority: Low   • Lumbosacral spondylosis without myelopathy 04/24/2009     Priority: Low       PAST MEDICAL, FAMILY AND SOCIAL HISTORY     Current Outpatient Medications   Medication Sig Dispense Refill   • [START ON 1/16/2020] clopidogrel (PLAVIX) 75 MG tablet Take 1 tablet by mouth daily. Do not start before January 16, 2020. 90 tablet 2   • traMADol (ULTRAM) 50 MG tablet Take 1 tablet by mouth every 8 hours as needed for Pain. 20 tablet 0   • Probiotic Product (PROBIOTIC-10 PO) Takes one once daily by mouth     • pantoprazole (PROTONIX) 20 MG tablet TAKE 1 TABLET BY MOUTH  DAILY 90 tablet 0   • tamsulosin (FLOMAX) 0.4 MG Cap Take 1 capsule by mouth daily after a meal. 90 capsule 3  Is This A New Presentation, Or A Follow-Up?: Skin Lesions   • amLODIPine (NORVASC) 5 MG tablet TAKE 1 TABLET BY MOUTH  DAILY 90 tablet 2   • losartan (COZAAR) 100 MG tablet TAKE 1 TABLET BY MOUTH  DAILY 90 tablet 2   • atorvastatin (LIPITOR) 20 MG tablet TAKE 1 TABLET BY MOUTH  DAILY 90 tablet 2   • DIAZepam (VALIUM) 2 MG tablet Take 1 tablet 30-60 minutes before dental procedure, may repeat in 30 minutes, if needed. 2 tablet 5   • SG SALINE NASAL NA Spray 1 Squirt in each nostril daily as needed.     • aspirin 81 MG tablet Take 81 mg by mouth daily.      • multivitamin (THERAGRAN) per tablet Take 1 tablet by mouth daily.         No current facility-administered medications for this visit.            Allergies:  ALLERGIES:   Allergen Reactions   • Cephalexin DIARRHEA   • Ciprofloxacin NAUSEA, DIARRHEA and WEAKNESS       Past Medical  History/Surgeries:  Past Medical History:   Diagnosis Date   • Anemia    • Anxiety    • Arthritis    • BPH (benign prostatic hypertrophy)    • Chronic kidney disease     stage 3   • Chronic pain     spinal stenosis   • COPD (chronic obstructive pulmonary disease) (CMS/HCC)    • Essential (primary) hypertension    • Fracture 2013    fx right ankle   • GERD (esophageal reflux)     Guerrier's esoph.  and Hiatal hernia; Schatzki ring   • H/O impaired glucose tolerance    • Hernia, inguinal, right    • Hyperlipidemia    • Inflammatory bowel disease     diarrhea   • Malignant neoplasm (CMS/HCC)     bladder   • Murmur    • PVD (peripheral vascular disease) (CMS/HCC) 8/2011    Hx. as per Dr. RADHA Nassar   • Tear of medial cartilage or meniscus of knee, current 01/21/2003    L knee   • Urinary tract infection    • Wears glasses    • Wears partial dentures        Past Surgical History:   Procedure Laterality Date   • Colonoscopy  01/23/2015    repeat in 5 yrs if in good health  / pre cancerous polyp Dr. Nizar Pallak [facility]   • Cystourethroscopy w one or more biopsy N/A 8/2/2019    TRANSURETHRAL RESECTION OF BLADDER TUMOR (MEDIUM) performed by Raymundo  MD Denise at [facility] MAIN OR   • Excisn coarct aorta w graft     • Eye surgery  2005     Reece. Cataract surg. W IOL   • Hemorrhoid surgery     • Prostate biopsy  2006    negative   • Service to gastroenterology  2010    3/2013    EGD x4;  Colonoscopyx3; Polypectomy   • Tonsillectomy and adenoidectomy         Family History:  Family History   Problem Relation Age of Onset   • Heart disease Father    • Stroke Mother        Social History:  Social History     Tobacco Use   • Smoking status: Former Smoker     Packs/day: 0.00     Types: Cigarettes     Last attempt to quit: 2013     Years since quittin.0   • Smokeless tobacco: Never Used   • Tobacco comment: 1-2 q 2-3 months   Substance Use Topics   • Alcohol use: Not Currently     Frequency: Never     Drinks per session: 1 or 2     Binge frequency: Never       REVIEW OF SYSTEMS     Review of Systems   All other systems reviewed and are negative.      PHYSICAL EXAM     Physical Exam  Vitals signs reviewed.   Constitutional:       General: He is not in acute distress.     Appearance: He is well-developed. He is not diaphoretic.   HENT:      Head: Normocephalic and atraumatic.   Eyes:      General: No scleral icterus.     Conjunctiva/sclera: Conjunctivae normal.      Pupils: Pupils are equal, round, and reactive to light.   Neck:      Musculoskeletal: Neck supple.      Vascular: No JVD.   Cardiovascular:      Rate and Rhythm: Normal rate and regular rhythm.      Heart sounds: Murmur present. No gallop.    Pulmonary:      Effort: Pulmonary effort is normal. No respiratory distress.      Breath sounds: Normal breath sounds. No wheezing or rales.   Abdominal:      General: There is no distension.      Palpations: Abdomen is soft. There is no mass.      Tenderness: There is no tenderness. There is no guarding.      Comments: Dressing over surgical wound, in place, no redness.    Musculoskeletal:      Lumbar back: He exhibits no tenderness, no bony tenderness, no deformity  and no spasm.      Right lower leg: Edema present.      Left lower leg: Edema present.   Skin:     General: Skin is warm.      Coloration: Skin is not pale.      Findings: No rash.   Neurological:      Mental Status: He is alert and oriented to person, place, and time.      Motor: No abnormal muscle tone.      Coordination: Coordination normal.   Psychiatric:         Behavior: Behavior normal.       Recentlab tests:   Sodium (mmol/L)   Date Value   01/09/2020 138   01/09/2020 138      Potassium (mmol/L)   Date Value   01/10/2020 5.3 (H)      BUN (mg/dL)   Date Value   01/09/2020 38 (H)   01/09/2020 38 (H)      Creatinine (mg/dL)   Date Value   01/09/2020 1.66 (H)   01/09/2020 1.66 (H)     Hemoglobin A1C (%)   Date Value   06/11/2014 5.8      Glucose (mg/dL)   Date Value   01/09/2020 109 (H)   01/09/2020 109 (H)      No results found for: MALBCR  CHOLESTEROL (mg/dL)   Date Value   10/24/2018 110      HDL (mg/dL)   Date Value   10/24/2018 42     TRIGLYCERIDE (mg/dL)   Date Value   10/24/2018 96      CALCULATED LDL (mg/dL)   Date Value   10/24/2018 49     ALT/SGPT (U/L)   Date Value   01/09/2020 22   01/09/2020 22      AST/SGOT (U/L)   Date Value   01/09/2020 28   01/09/2020 28     HGB (g/dL)   Date Value   01/09/2020 10.6 (L)   01/09/2020 10.6 (L)     TSH (uIU/mL)   Date Value   01/09/2020 4.120   01/09/2020 4.120     VITAMIND, 25 HYDROXY (ng/mL)   Date Value   10/18/2017 49.4           ASSESSMENT/PLAN     Hx laparoscopic repair of colovesicular fistula, bladder repair low anterior resection with low coloproctostomy abdominal transanal approachon 1/2/2020. Dr Mitchell Paz.     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 GI consult follows.     ...  HTN. low salt diet. Improved control.   Nephrology also follows.     MR, s/p Cardiology consult  12/201/9. Cardiology follows.   RBBB, LAFB. Asymptomatic. Cardiology follows.      B leg edema, onset after AAA surgery. Lasix 20 mg 1 daily prn. Compression stocking advised. Stat doppler due to increased edema.  Arteriosclerotic vascular disease of the legs  Hx B iliac aneurysm and AAA, s/p endovascular repair 8/2015. Dr Philip Barajas.     Dyslipidemia, Hx low HDL. Normalized.   Glucose intolerance, normalized with diet.   Obesity. Wt loss d/w pt.     Status post cigarette usage.  Hx chronic obstructive pulmonary disease. No active symptoms.    Esophageal reflux with Guerrier's esophagitis.  Hx adenomatous polyps.  Status post hemorrhoidectomy.   Diverticulosis. High fiber diet advised.     CRI. Cr stable. Advised to avoid nsaids. Nephrology consult follows.  FHx ESRD (father).   Urinary incontinence, urinary frequency. Urology follows.   Benign prostatic hypertrophy with benign biopsies. Urines and PSAs followed by urology at [facility].   Hx elevated PSA.  Urology consult.     Mild anemia. No bleeding.  Possible anemia of renal disease. Iron deficiency. Advised Fe sulfate 325 mg 2/day. Monitor.    Hx R hand OA, pain improved now with tylenol. He defer hand Ortho consult for now. RF negative. Continue tylenol prn. glucosamine d/w pt.   Hx B hip pain. OA changes on xray. Ortho follow up advised.         PSA, Total (ng/mL)   Date Value   04/27/2017 4.69 (H)   01/28/2016 4.29 (H)

## 2025-01-20 ENCOUNTER — TELEPHONE (OUTPATIENT)
Dept: PRIMARY CARE CLINIC | Facility: CLINIC | Age: 80
End: 2025-01-20
Payer: MEDICARE

## 2025-01-20 NOTE — TELEPHONE ENCOUNTER
Left voicemail informing pt that we will not be in the office tomorrow. I will call her back to reschedule.

## 2025-01-21 ENCOUNTER — TELEPHONE (OUTPATIENT)
Dept: PRIMARY CARE CLINIC | Facility: CLINIC | Age: 80
End: 2025-01-21
Payer: MEDICARE

## 2025-01-21 NOTE — TELEPHONE ENCOUNTER
Called pt, explained we were closed today. Reminded pt that she has an appt to check her blood pressure and rescheduled for Friday at 11am. Left phone number to clinic for call back.

## 2025-01-24 ENCOUNTER — TELEPHONE (OUTPATIENT)
Dept: PRIMARY CARE CLINIC | Facility: CLINIC | Age: 80
End: 2025-01-24
Payer: MEDICARE

## 2025-01-24 NOTE — TELEPHONE ENCOUNTER
Spoke to pt, explained that Dr. Louis is seeing pts tomorrow. Offered pt Saturday appt. Pt refused, she stated that she has something to do and she will not be home.     Pt is scheduled for appt in June

## 2025-01-28 ENCOUNTER — TELEPHONE (OUTPATIENT)
Dept: PRIMARY CARE CLINIC | Facility: CLINIC | Age: 80
End: 2025-01-28
Payer: MEDICARE

## 2025-01-28 NOTE — TELEPHONE ENCOUNTER
I called [facility] and the pharmacist said she hasn't heard about that, and denied they're sending us anything with info about that recall.

## 2025-01-28 NOTE — TELEPHONE ENCOUNTER
Can you please confirm with pharmacy. I know nothing about this. Will look into it. If so we can switch to synthroid.

## 2025-01-28 NOTE — TELEPHONE ENCOUNTER
----- Message from Lakeshia sent at 1/28/2025  8:33 AM CST -----  Regarding: pt advice - medication being recall  [phone] - call back ;  was asking what to do one of he medication is being recalled  Which is the   levothyroxine (SYNTHROID) 50 MCG tablet:  ; [facility] sending letter that this medication is being recall.      Lakeshia

## 2025-01-29 NOTE — TELEPHONE ENCOUNTER
Patient states she got the information in a letter she received from [facility]. There is an 800 number on the letter she received. She will call and ask for more information.

## 2025-02-13 ENCOUNTER — OFFICE VISIT (OUTPATIENT)
Dept: PRIMARY CARE CLINIC | Facility: CLINIC | Age: 80
End: 2025-02-13
Payer: MEDICARE

## 2025-02-13 VITALS — BODY MASS INDEX: 35.3 KG/M2 | HEART RATE: 70 BPM | HEIGHT: 58 IN | WEIGHT: 168.19 LBS | OXYGEN SATURATION: 98 %

## 2025-02-13 DIAGNOSIS — I10 HYPERTENSION, UNSPECIFIED TYPE: Primary | ICD-10-CM

## 2025-02-13 DIAGNOSIS — R04.0 EPISTAXIS: ICD-10-CM

## 2025-02-13 PROCEDURE — 99215 OFFICE O/P EST HI 40 MIN: CPT | Mod: S$PBB,,, | Performed by: PHYSICIAN ASSISTANT

## 2025-02-13 PROCEDURE — 99999 PR PBB SHADOW E&M-EST. PATIENT-LVL III: CPT | Mod: PBBFAC,,, | Performed by: PHYSICIAN ASSISTANT

## 2025-02-13 PROCEDURE — 99213 OFFICE O/P EST LOW 20 MIN: CPT | Mod: PBBFAC,PN | Performed by: PHYSICIAN ASSISTANT

## 2025-02-13 RX ORDER — CHLORTHALIDONE 25 MG/1
12.5 TABLET ORAL DAILY
Qty: 15 TABLET | Refills: 0 | Status: SHIPPED | OUTPATIENT
Start: 2025-02-13 | End: 2025-03-15

## 2025-02-13 NOTE — PROGRESS NOTES
Primary Care Provider Appointment   [facility], [facility]       Patient ID: Silvia Bernal is a 79 y.o. female.    ASSESSMENT/PLAN by Problem List:    1. Hypertension, unspecified type    2. Epistaxis    Other orders  -     chlorthalidone (HYGROTEN) 25 MG Tab; Take 0.5 tablets (12.5 mg total) by mouth once daily.  Dispense: 15 tablet; Refill: 0       Assessment & Plan    IMPRESSION:  - Considered medication options for managing patient's hypertension.  - Decided on a conservative approach with Hygrotin, starting at half dose and titrating as needed    SEVERE OBESITY (BMI 35.0-39.9) WITH COMORBIDITY:  - Discussed patient's dietary habits, including high consumption of [place] food and frequent party attendance, contributing to elevated salt intake and affecting blood pressure.  - Previous blood pressure reading was 170/88 during visit with Dr. Louis, but current reading is 137, considered normal after patient increased water intake and reduced salt consumption.  - Recommend continued increased water intake and reduced salt consumption to manage blood pressure.  - As a precautionary measure, prescribed Hygroten, starting with half a pill daily, with instructions to increase to full dose if needed.  - Scheduled follow-up visit in 2 weeks to assess medication effectiveness and overall progress.    HYPERTENSION:  - Explained different classes of antihypertensive medications and their mechanisms of action.  - Prescribed Hygrotin at half the standard dose, instructing the patient to take half a tablet daily.  - Advised the patient to increase to full tablet after 1-2 weeks if blood pressure is not adequately controlled.  - Instructed the patient to monitor and log blood pressure readings.  - Scheduled follow-up visit on February 28th at 9:20 AM to reassess blood pressure control and medication efficacy.  - Requested the patient to bring completed blood pressure log to next  appointment.    NOSEBLEED:  - Noted patient's recent nosebleed lasting 30 minutes, resulting in an ER visit.  - Educated the patient on the importance of consistent pressure application for nosebleed management.  - Patient has had a few bleeds since only lasting a few seconds  - Patient is scheduled to see ENT.      Follow Up:  2 weeks    My total time spent on this encounter was 56 minutes which included the following activities: preparing to see the patient, performing a medically appropriate and/or evaluation, counseling and educating the patient and family/caregiver, ordering medications, tests, or procedures, referring and communicating with other healthcare providers, documenting clinical information in the electronic or other health record, and independently interpreting results. This time is independent and non-overlapping.       Subjective:     Chief Complaint   Patient presents with    Follow-up     Bp high     I have reviewed the information entered by the ancillary staff regarding the chief complaint as well as the related history.    HPI    Patient is a/an 79 y.o.  female     History of Present Illness    CHIEF COMPLAINT:  Silvia presents for a follow-up visit to address previously elevated blood pressure.    HPI:  Silvia was seen by Dr. Louis approximately 1 month ago for elevated blood pressure, recorded as 170/88. A follow-up visit was recommended, but she had to cancel due to an upper respiratory infection. Recently, she had a nosebleed lasting 30 minutes, prompting an emergency room visit. The ER physician demonstrated a technique using a rolled-up material to apply pressure and stop the nosebleed.    She reports her blood pressure this morning was approximately 137 systolic. She attended a [employer] party on Sunday, after which she felt unwell, attributing it to high sodium intake. In response, she stayed home the next day, consuming at least 10 glasses of water, resulting in frequent urination  (approximately 12 times) and feeling improved the next morning. She acknowledges that her consumption of [place] cuisine, often high in sodium, may contribute to her blood pressure issues.    She is currently taking diltiazem (a calcium channel blocker) and ramipril (an ACE inhibitor) for blood pressure management. She also has furosemide (Lasix) prescribed as needed, which she chose not to take recently, opting instead to increase water intake. She reports that Lasix does not significantly affect her urination frequency.    She has a history of gout but has been on allopurinol for some time. She has not had an elevated uric acid level or a gout attack in years since starting the medication. She had previously been on HCTZ, but this was discontinued bc it was thought to contribute to gout flares. At the time she was not taking the Allopurinol.    MEDICATIONS:  Silvia is on Diltiazem and Ramipril for hypertension management. She is also taking Allopurinol for gout prevention and Furosemide (Lasix) as needed for fluid retention.     MEDICAL HISTORY:  Silvia has a history of hypertension. She also has a history of gout prior to starting Allopurinol.    TEST RESULTS:  Silvia's uric acid levels have not been elevated for years since starting Allopurinol.      ROS:  Head: -headaches  ENT: +nosebleeds  Respiratory: -cough  Genitourinary: +frequency  Musculoskeletal: -joint pain          For complete problem list, past medical history, surgical history, social history, etc., see appropriate section in the electronic medical record      Objective     Physical Exam  Vitals and nursing note reviewed.   Constitutional:       General: She is not in acute distress.     Appearance: Normal appearance. She is not ill-appearing.   HENT:      Head: Normocephalic and atraumatic.      Right Ear: External ear normal.      Left Ear: External ear normal.   Eyes:      General:         Right eye: No discharge.         Left eye: No discharge.  "     Extraocular Movements: Extraocular movements intact.      Conjunctiva/sclera: Conjunctivae normal.   Cardiovascular:      Rate and Rhythm: Normal rate and regular rhythm.   Pulmonary:      Effort: Pulmonary effort is normal. No respiratory distress.   Skin:     General: Skin is warm and dry.      Coloration: Skin is not jaundiced or pale.   Neurological:      Mental Status: She is alert.   Psychiatric:         Mood and Affect: Mood normal.         Behavior: Behavior normal.         Thought Content: Thought content normal.         Judgment: Judgment normal.       Vitals:    02/13/25 1307   BP: (P) 136/82   Patient Position: Sitting   Pulse: 70   SpO2: 98%   Weight: 76.3 kg (168 lb 3.4 oz)   Height: 4' 10" (1.473 m)       This note was generated with the assistance of ambient listening technology. Verbal consent was obtained by the patient and accompanying visitor(s) for the recording of patient appointment to facilitate this note. I attest to having reviewed and edited the generated note for accuracy, though some syntax or spelling errors may persist. Please contact the author of this note for any clarification.       THIS DOCUMENT WAS MADE IN PART WITH VOICE RECOGNITION SOFTWARE.  OCCASIONALLY THIS SOFTWARE WILL MISINTERPRET WORDS OR PHRASES.  "

## 2025-02-19 ENCOUNTER — OFFICE VISIT (OUTPATIENT)
Dept: OTOLARYNGOLOGY | Facility: CLINIC | Age: 80
End: 2025-02-19
Payer: MEDICARE

## 2025-02-19 VITALS — HEIGHT: 58 IN | WEIGHT: 167.13 LBS | BODY MASS INDEX: 35.08 KG/M2

## 2025-02-19 DIAGNOSIS — R04.0 ANTERIOR EPISTAXIS: Primary | ICD-10-CM

## 2025-02-19 PROCEDURE — 99214 OFFICE O/P EST MOD 30 MIN: CPT | Mod: PBBFAC,PO | Performed by: OTOLARYNGOLOGY

## 2025-02-19 RX ORDER — FLUTICASONE PROPIONATE 50 MCG
1 SPRAY, SUSPENSION (ML) NASAL DAILY
COMMUNITY

## 2025-02-19 NOTE — PROGRESS NOTES
Subjective:       Patient ID: Silvia Bernal is a 79 y.o. female.    Chief Complaint: Epistaxis    Silvia is here for epistaxis.   Length of symptoms: 3 weeks.  Bilateral. Seemed to begin following a cold.   One episode of moderate in nature, all others temporarily mild.      No sig nasal allergy issues. No recurrent sinusitis.     Patient validated questionnaires (if applicable):      %            No data to display                   No data to display                   No data to display                     Tobacco Use History[1]  Social History     Substance and Sexual Activity   Alcohol Use Yes    Alcohol/week: 0.0 standard drinks of alcohol    Comment: very occasionally          Objective:        Constitutional:   She is oriented to person, place, and time. She appears well-developed and well-nourished. She appears alert. She is active. Normal speech.      Head:  Normocephalic and atraumatic. Head is without TMJ tenderness. No scars. Salivary glands normal.  Facial strength is normal.      Ears:    Right Ear: No drainage or swelling. No middle ear effusion.   Left Ear: No drainage or swelling.  No middle ear effusion.     Nose:  Septal deviation (mild) present. No mucosal edema, rhinorrhea or sinus tenderness. No turbinate hypertrophy.    Mild prominent vessel along MC / septal junction on left. No visible vessels on right.  Mild dryness of middle turbinate.     Mouth/Throat  Oropharynx clear and moist without lesions or asymmetry, normal uvula midline and mirror exam normal. Normal dentition. No uvula swelling, lacerations or trismus. No oropharyngeal exudate. Tonsillar erythema, tonsillar exudate.      Neck:  Full range of motion with neck supple and no adenopathy. Thyroid tenderness is present. No tracheal deviation, no edema, no erythema, normal range of motion, no stridor, no crepitus and no neck rigidity present. No thyroid mass present.     Cardiovascular:    Intact distal pulses and normal pulses.               Pulmonary/Chest:   Effort normal and breath sounds normal. No stridor.     Psychiatric:   Her speech is normal and behavior is normal. Her mood appears not anxious. Her affect is not labile.     Neurological:   She is alert and oriented to person, place, and time. No sensory deficit.     Skin:   No abrasions, lacerations, lesions, or rashes. No abrasion and no bruising noted.         Tests / Results:  none    Assessment:       1. Anterior epistaxis          Plan:         We discussed epistaxis.  She would like to use supportive measures   Notify if persistent and can consider nasal cautery           [1]   Social History  Tobacco Use   Smoking Status Former    Current packs/day: 0.00    Average packs/day: 1 pack/day for 50.0 years (50.0 ttl pk-yrs)    Types: Cigarettes    Start date: 1968    Quit date: 2018    Years since quittin.3   Smokeless Tobacco Never   Tobacco Comments    social only at parties

## 2025-02-19 NOTE — PATIENT INSTRUCTIONS
Nasal Moisture Therapy:    A dry nose can cause crusting, pain, bleeding, nasal congestion, and intermittent drainage. It is important to keep the nose moisturized. This is the best way to reduce the risk of bleeding, as it prevents the blood vessels from coming to the surface and opening up.     Nasal emollients (moisturizers) are most important. There are multiple over the counter or natural products available, including many that are around the house. Ones that I like are:  Coconut oil, Aquaphor, Ponaris nasal ointment, Vaseline or Mupirocin bacterial ointment if there is also an infection associated with it. For any of these:  Deposit a pea or dime sized amount into the entrance to the nasal cavity with a q-tip or pinky finger. Apply to the septum (portion that divides the left and right side) as this is the area where crusting and bleeding develops more commonly  Perform this at least 2 times daily, including before bed. This allows the ointment to melt along the nasal cavity when you lay down.  Nasal saline spray or irrigations can help wash away mucous and crusting and keep the nose healthy. Perform BEFORE applying the nasal emollient.  Use a humidifier in the bedroom  If you use CPAP, make sure it has humidification on it.  Be aware that medical nasal sprays can occasionally dry the nose out, so keep the nose moist while using these medications.

## 2025-03-25 DIAGNOSIS — I51.89 DIASTOLIC DYSFUNCTION: ICD-10-CM

## 2025-03-25 RX ORDER — POTASSIUM CHLORIDE 1500 MG/1
20 TABLET, EXTENDED RELEASE ORAL EVERY MORNING
Qty: 90 TABLET | Refills: 1 | Status: SHIPPED | OUTPATIENT
Start: 2025-03-25

## 2025-04-17 ENCOUNTER — TELEPHONE (OUTPATIENT)
Dept: PRIMARY CARE CLINIC | Facility: CLINIC | Age: 80
End: 2025-04-17
Payer: MEDICARE

## 2025-04-17 NOTE — TELEPHONE ENCOUNTER
----- Message from Lakeshia sent at 4/17/2025  9:03 AM CDT -----  Regarding: pt advice - popping ears  [phone]-call back ;  need medication to ease her popping ears; she believes she congested; Or any over the counter she can get. send to [facility] #[id number] - [place] - [id number] [place] [address]Mrhou: [phone] Fax: [phone]: Not open 24 hoursAnnabelle

## 2025-04-17 NOTE — TELEPHONE ENCOUNTER
Patient was encouraged to take Singulair, Zyrtec, or Zyxal and to use her Flonase and Azelastine nasal sprays. She verbalized understanding.

## 2025-04-22 DIAGNOSIS — J31.0 RHINITIS, UNSPECIFIED TYPE: Primary | ICD-10-CM

## 2025-04-22 RX ORDER — MONTELUKAST SODIUM 10 MG/1
10 TABLET ORAL NIGHTLY
Qty: 90 TABLET | Refills: 3 | Status: SHIPPED | OUTPATIENT
Start: 2025-04-22

## 2025-05-20 ENCOUNTER — TELEPHONE (OUTPATIENT)
Dept: PRIMARY CARE CLINIC | Facility: CLINIC | Age: 80
End: 2025-05-20
Payer: MEDICARE

## 2025-05-20 DIAGNOSIS — R30.0 BURNING WITH URINATION: Primary | ICD-10-CM

## 2025-05-20 DIAGNOSIS — R30.0 DYSURIA: ICD-10-CM

## 2025-05-20 NOTE — TELEPHONE ENCOUNTER
I left message informing the patient that we need to know what symptoms are leading her to believe she has a uti. Also, that she needs her urine tested prior to being prescribed abx.

## 2025-05-20 NOTE — TELEPHONE ENCOUNTER
----- Message from Cathie sent at 5/20/2025  2:35 PM CDT -----  Type:  Needs Medical AdviceWho Called: ptSymptoms (please be specific): UTI How long has patient had these symptoms:  todayPharmacy name and phone #:  [id number] - [place] - [id number] [place] [address]Djvnd: [phone] Fax: [phone] the patient rather a call back or a response via [facility]? Please callBest Call Back Number: [phone] Information: pt needs antibiotics called in for her UTI   Please call back to advise. Thanks!

## 2025-05-20 NOTE — TELEPHONE ENCOUNTER
Patient returned call and said the burning with urination, frequency and pink-tinged urine started this morning. Patient declined a UA order be placed, stating she will call back in the morning to let Dr Louis know how she is feeling.

## 2025-05-22 NOTE — TELEPHONE ENCOUNTER
Per Nargis, someone from [facility] called to ask about where patient can drop off urine specimen for UTI symptoms, but when I called patient to say that she can leave specimen there, that I'll just put in an order for UA, patient didn't answer again and I had to leave a message.

## 2025-05-22 NOTE — TELEPHONE ENCOUNTER
When she wishes to answer our return calls, she may go to the lab to drop off some urine so we can immediately test and treat her if needed.

## 2025-06-12 DIAGNOSIS — E03.4 HYPOTHYROIDISM DUE TO ACQUIRED ATROPHY OF THYROID: ICD-10-CM

## 2025-06-12 DIAGNOSIS — M10.472 OTHER SECONDARY ACUTE GOUT OF LEFT FOOT: ICD-10-CM

## 2025-06-13 RX ORDER — ALLOPURINOL 100 MG/1
200 TABLET ORAL
Qty: 180 TABLET | Refills: 1 | Status: SHIPPED | OUTPATIENT
Start: 2025-06-13

## 2025-06-13 RX ORDER — LEVOTHYROXINE SODIUM 50 UG/1
50 TABLET ORAL
Qty: 90 TABLET | Refills: 1 | Status: SHIPPED | OUTPATIENT
Start: 2025-06-13

## 2025-06-27 ENCOUNTER — RESULTS FOLLOW-UP (OUTPATIENT)
Dept: PRIMARY CARE CLINIC | Facility: CLINIC | Age: 80
End: 2025-06-27
Payer: MEDICARE

## 2025-06-30 ENCOUNTER — OFFICE VISIT (OUTPATIENT)
Dept: PRIMARY CARE CLINIC | Facility: CLINIC | Age: 80
End: 2025-06-30
Payer: MEDICARE

## 2025-06-30 VITALS
SYSTOLIC BLOOD PRESSURE: 130 MMHG | DIASTOLIC BLOOD PRESSURE: 68 MMHG | HEIGHT: 58 IN | TEMPERATURE: 98 F | HEART RATE: 61 BPM | BODY MASS INDEX: 36.14 KG/M2 | WEIGHT: 172.19 LBS | OXYGEN SATURATION: 96 %

## 2025-06-30 DIAGNOSIS — E78.00 HYPERCHOLESTEROLEMIA: ICD-10-CM

## 2025-06-30 DIAGNOSIS — G47.33 OSA ON CPAP: ICD-10-CM

## 2025-06-30 DIAGNOSIS — E03.4 HYPOTHYROIDISM DUE TO ACQUIRED ATROPHY OF THYROID: Primary | ICD-10-CM

## 2025-06-30 DIAGNOSIS — Z87.891 HISTORY OF NICOTINE USE: ICD-10-CM

## 2025-06-30 DIAGNOSIS — J30.1 SEASONAL ALLERGIC RHINITIS DUE TO POLLEN: ICD-10-CM

## 2025-06-30 DIAGNOSIS — Z72.0 TOBACCO USE: ICD-10-CM

## 2025-06-30 DIAGNOSIS — I48.0 PAROXYSMAL ATRIAL FIBRILLATION: ICD-10-CM

## 2025-06-30 DIAGNOSIS — E66.01 SEVERE OBESITY (BMI 35.0-39.9) WITH COMORBIDITY: ICD-10-CM

## 2025-06-30 DIAGNOSIS — I10 HYPERTENSION, UNSPECIFIED TYPE: ICD-10-CM

## 2025-06-30 PROCEDURE — 99215 OFFICE O/P EST HI 40 MIN: CPT | Mod: PBBFAC,PN | Performed by: INTERNAL MEDICINE

## 2025-06-30 PROCEDURE — 99214 OFFICE O/P EST MOD 30 MIN: CPT | Mod: S$PBB,,, | Performed by: INTERNAL MEDICINE

## 2025-06-30 PROCEDURE — 99999 PR PBB SHADOW E&M-EST. PATIENT-LVL V: CPT | Mod: PBBFAC,,, | Performed by: INTERNAL MEDICINE

## 2025-06-30 NOTE — PROGRESS NOTES
"INTERNAL MEDICINE PROGRESS/URGENT CARE NOTE    CHIEF COMPLAINT     Chief Complaint   Patient presents with    Follow-up     Patient presents for her 6 month follow up appointment.       NORAH     Silvia Bernal is a 79 y.o.  female who presents with a PMHx of  history of Afib, HTN, HLD, IBS, SYLVAIN on CPAP, osteoporosis, thyroid nodule, ex-smoker (quit in 2015) who presents today for routine follow up care.      Her main complaints and concerns are:   No longer on statin. Says the cardiologist told her to she doesn't need it so it was stopped. It was started due to stenosis of the artery in her legs. She says he reviewed the US and notes no stenosis.         At her previous visits, we discussed:   Allergies. Discussed treatment  numbness of both feet. Today, she describes unclear history to deliniate vascular vs nerve problem or could be both. First described that she has bumbness in her feet when she gets up in the am, and when she sits for too long like recently when she went to a show and sat there for over two hours. She has to stand for a while and let the "blood return to my legs before I can feel my feet and start walking" On further ROS, mentions that she also has to stop when she goes shopping with her friends due to jagruti in her legs. Reviewed the old arterial US and venous. Going to see her cardiologist shortly and says she will discuss this. Disc ussed doing a repeat arterial study for what sounds like claudication. Asked patient why she's not on a statin and I'm not sure what the answer was. Again unclear . Says her cardiologist asked her the same question and she replied "Well I like apple cider vinegar" . I've advised patient I would recommend statin use based on the review of her old arterial US LE.  Denies back pain. Says the numbness is episodic and usually occurs under the circumstances discussed above. 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 Surgeons office called her and stated "Pt says she is unsure of when she will be able to make an appointment. "Had a nerve study done which she says was normal.  3.  Weight control. Has lost about 7-8 pounds. Says she has been eating well.      Anxiety: says her anxiety is well controlled. Says has lot of friends and social support   GAD7 score is 7/21     IBS: with both constipation/diarrhea. Mostly constipation     SYLVAIN: on CPAP which she reports she does use every night.      Prediabetes: Previous hgb A1c 5.7, now doen to 5.6 with wieght loss and improved diet.       Home Medications:  Prior to Admission medications    Medication Sig Start Date End Date Taking? Authorizing Provider   allopurinoL (ZYLOPRIM) 100 MG tablet TAKE 2 TABLETS BY MOUTH ONCE DAILY 6/13/25   Eloisa Louis MD   ASPIRIN LOW DOSE ORAL Take 81 mg by mouth once daily. 6/9/15   Provider, Historical   azelastine (ASTELIN) 137 mcg (0.1 %) nasal spray 1 spray (137 mcg total) by Nasal route 2 (two) times daily. 12/28/23 12/27/24  Eloisa Louis MD   chlorthalidone (HYGROTEN) 25 MG Tab Take 0.5 tablets (12.5 mg total) by mouth once daily. 2/13/25 3/15/25  Evelyn Copeland PA   cholecalciferol, vitamin D3, (VITAMIN D3) 25 mcg (1,000 unit) capsule Take 3,000 Units by mouth once daily.    Provider, Historical   compress.stocking,knee,reg,med Misc 1 Pair by Misc.(Non-Drug; Combo Route) route once daily. 15-20 mmHg 6/14/22   Silvestre Nicholson APRN   diltiaZEM (CARDIZEM CD) 180 MG 24 hr capsule TAKE 1 CAPSULE BY MOUTH ONCE DAILY. 2/19/25   Kilo Maki III, MD   EMU OIL, BULK, MISC 1 applicator by Misc.(Non-Drug; Combo Route) route as needed.    Provider, Historical   estradiol (ESTRACE) 0.5 MG tablet Take 0.5 mg by mouth Every 3 (three) days.  9/3/15   Provider, Historical   fluticasone " propionate (FLONASE) 50 mcg/actuation nasal spray 1 spray by Each Nostril route once daily.    Provider, Historical   furosemide (LASIX) 20 MG tablet TAKE 1 TABLET (20 MG TOTAL) BY MOUTH DAILY AS NEEDED (SWELLING OR WEIGHT GAIN). 12/20/23   Bailee Dong, NP   levothyroxine (SYNTHROID) 50 MCG tablet TAKE 1 TABLET BY MOUTH BEFORE BREAKFAST. 6/13/25   Eloisa Louis MD   magnesium 250 mg Tab Take 1 tablet (250 mg total) by mouth every evening. 6/9/22   Kristen Villafana, MARIALUISA   miconazole NITRATE 2 % (MICOTIN) 2 % top powder Apply topically as needed for Itching. 10/30/24 11/29/24  Jihan Asher MD   montelukast (SINGULAIR) 10 mg tablet Take 1 tablet (10 mg total) by mouth every evening. 4/22/25   Eloisa Louis MD   potassium chloride (K-TAB) 20 mEq TAKE 1 TABLET (20 MEQ TOTAL) BY MOUTH EVERY MORNING 3/25/25   Eloisa Louis MD   ramipriL (ALTACE) 10 MG capsule TAKE 1 CAPSULE TWICE DAILY 4/17/25   Kilo Maki III, MD SKYRIZI 150 mg/mL Syrg Inject into the skin. 1/27/23   Medical, First Priority   VITAMIN B COMPLEX (B COMPLEX ORAL) Take 1 tablet by mouth once daily.    Provider, Historical   zinc gluconate 50 mg tablet Take 50 mg by mouth once daily.    Provider, Historical       Review of Systems:  Review of Systems        Advance Care Planning     Date: 06/30/2025    Power of   I initiated the process of voluntary advance care planning today and explained the importance of this process to the patient.  I introduced the concept of advance directives to the patient, as well. Then the patient received detailed information about the importance of designating a Health Care Power of  (HCPOA). She was also instructed to communicate with this person about their wishes for future healthcare, should she become sick and lose decision-making capacity. The patient has previously appointed a HCPOA. After our discussion, the patient has decided to complete a HCPOA and has appointed  "her sister, health care agent: on file & health care agent number: on file. I encouraged her to communicate with this person about their wishes for future healthcare, should she become sick and lose decision-making capacity.      A total of 10 min was spent on advance care planning, goals of care discussion, emotional support, formulating and communicating prognosis and exploring burden/benefit of various approaches of treatment. This discussion occurred on a fully voluntary basis with the verbal consent of the patient and/or family.             PHYSICAL EXAM     /68 (BP Location: Right arm, Patient Position: Sitting)   Pulse 61   Temp 97.9 °F (36.6 °C)   Ht 4' 10" (1.473 m)   Wt 78.1 kg (172 lb 2.9 oz)   SpO2 96%   BMI 35.99 kg/m²     GEN - A+OX4, NAD   HEENT - PERRL, EOMI, OP clear  Neck - No thyromegaly or cervical LAD. No thyroid masses felt.  CV - RRR, no m/r   Chest - CTAB, no wheezing or rhonchi  Abd - S/NT/ND/+BS.   Ext - 2+BDP and radial pulses. No C/C/E.  Skin - No rash.    LABS         ASSESSMENT/PLAN     Silvia Bernal is a 79 y.o. female with  1. Hypothyroidism due to acquired atrophy of thyroid  Overview:  Tsh wnl  Continue current management  Asymptomatic       2. Paroxysmal atrial fibrillation  No longer  Follows with cardiology    3. Hypertension, unspecified type  Bp is well controlled.     4. Hypercholesterolemia  Overview:  Cholesterol is very well controlled. Continue current management   Limit cholesterol in diet .  Encouraged consistent cardio excercise      5. SYLVAIN on CPAP  Encouraged compliance with CPAP     6. Tobacco use, quit 4 yrs ago  continue    7. History of nicotine use  -     CT Chest Lung Screening Low Dose; Future; Expected date: 06/30/2025    8. Seasonal allergic rhinitis due to pollen  Overview:  Discussed antihistamine and ICS  Avoid allergens       9. Severe obesity (BMI 35.0-39.9) with comorbidity  Overview:  Encouraged safe weight loss via low calorie diet " and exercise              WORRY SCORE 2    RTC in 3 months, sooner if needed and depending on labs.    Eloisa Louis MD  Board Certified Internist/Geriatrician  [facility]

## 2025-07-22 ENCOUNTER — OFFICE VISIT (OUTPATIENT)
Dept: PRIMARY CARE CLINIC | Facility: CLINIC | Age: 80
End: 2025-07-22
Payer: MEDICARE

## 2025-07-22 VITALS
BODY MASS INDEX: 36.66 KG/M2 | HEIGHT: 58 IN | WEIGHT: 174.63 LBS | TEMPERATURE: 98 F | HEART RATE: 65 BPM | OXYGEN SATURATION: 98 % | DIASTOLIC BLOOD PRESSURE: 62 MMHG | SYSTOLIC BLOOD PRESSURE: 138 MMHG

## 2025-07-22 DIAGNOSIS — J06.9 VIRAL URI WITH COUGH: Primary | ICD-10-CM

## 2025-07-22 LAB
CTP QC/QA: YES
SARS-COV-2 RDRP RESP QL NAA+PROBE: NEGATIVE

## 2025-07-22 PROCEDURE — 99999PBSHW: Mod: PBBFAC,,,

## 2025-07-22 PROCEDURE — 99213 OFFICE O/P EST LOW 20 MIN: CPT | Mod: S$PBB,,, | Performed by: PHYSICIAN ASSISTANT

## 2025-07-22 PROCEDURE — 99214 OFFICE O/P EST MOD 30 MIN: CPT | Mod: PBBFAC,PN | Performed by: PHYSICIAN ASSISTANT

## 2025-07-22 PROCEDURE — 99999 PR PBB SHADOW E&M-EST. PATIENT-LVL IV: CPT | Mod: PBBFAC,,, | Performed by: PHYSICIAN ASSISTANT

## 2025-07-22 PROCEDURE — 87635 SARS-COV-2 COVID-19 AMP PRB: CPT | Mod: PBBFAC,PN | Performed by: PHYSICIAN ASSISTANT

## 2025-07-22 NOTE — PROGRESS NOTES
"Subjective:      Patient ID: Silvia Bernal is a 79 y.o. female.    Vitals:  height is 4' 10" (1.473 m) and weight is 79.2 kg (174 lb 9.7 oz). Her temperature is 98.1 °F (36.7 °C). Her blood pressure is 138/62 and her pulse is 65. Her oxygen saturation is 98%.     Chief Complaint: Cough (Patient presents for sore throat and cough x2 days after returning from vacation. Patient Patient denied fever and hasn't taken anything to manage her symptoms.)    79 year old female presents for evaluation of cough and sore throat. Patient says symptoms started 2 days ago when she got home from vacation. She says brother in law was sick while they were on vacation and she believes he gave it to her. He was coughing constantly over vacation. Patient denies any subjective fever. Denies post nasal drip. Denies shortness of breath or wheexing. Scant clear sputum. One episode this am with a little "pink" in the sputum.        Constitution: Negative for fever.   HENT:  Positive for postnasal drip and sore throat.    Respiratory:  Positive for cough. Negative for shortness of breath.    Gastrointestinal:  Positive for nausea.      Objective:     Physical Exam   Constitutional: She does not appear ill. No distress.   HENT:   Head: Normocephalic and atraumatic.   Ears:   Right Ear: External ear normal.   Left Ear: External ear normal.   Mouth/Throat: Mucous membranes are moist. No oropharyngeal exudate or posterior oropharyngeal erythema. Oropharynx is clear.   Eyes: Conjunctivae are normal. Right eye exhibits no discharge. Left eye exhibits no discharge. Extraocular movement intact   Cardiovascular: Normal rate and regular rhythm.   Pulmonary/Chest: Effort normal. No respiratory distress. She has no wheezes. She has no rhonchi. She has no rales.   Abdominal: Normal appearance.   Neurological: She is alert.   Skin: Skin is warm, dry and not pale.   Psychiatric: Her behavior is normal. Mood, judgment and thought content normal. "   Nursing note and vitals reviewed.      Assessment:     1. Viral URI with cough        Plan:       Viral URI with cough  -     POCT COVID-19 Rapid Screening      Results for orders placed or performed in visit on 07/22/25   POCT COVID-19 Rapid Screening    Collection Time: 07/22/25 10:27 AM   Result Value Ref Range    POC Rapid COVID Negative Negative     Acceptable Yes         Physical exam is unremarkable. Throat clear and lungs CTAB. Most likely viral URI, but could also be seasonal/environmental allergies. Discussed OTC treatments. Discussed warning signs/symptoms and reasons to call the clinic. Patient voices understanding and agrees with plan.        I spent a total of 20 minutes on the day of the visit.  This includes face to face time and non-face to face time preparing to see the patient (eg, review of tests), obtaining and/or reviewing separately obtained history, documenting clinical information in the electronic or other health record, independently interpreting results and communicating results to the patient/family/caregiver, or care coordinator.

## 2025-07-24 ENCOUNTER — HOSPITAL ENCOUNTER (OUTPATIENT)
Dept: RADIOLOGY | Facility: HOSPITAL | Age: 80
Discharge: HOME | End: 2025-07-24
Attending: INTERNAL MEDICINE
Payer: MEDICARE

## 2025-07-24 DIAGNOSIS — Z87.891 HISTORY OF NICOTINE USE: ICD-10-CM

## 2025-07-24 PROCEDURE — 71271 CT THORAX LUNG CANCER SCR C-: CPT | Mod: TC,PO

## 2025-07-24 PROCEDURE — 71271 CT THORAX LUNG CANCER SCR C-: CPT | Mod: 26,,, | Performed by: RADIOLOGY

## 2025-07-25 ENCOUNTER — TELEPHONE (OUTPATIENT)
Dept: PRIMARY CARE CLINIC | Facility: CLINIC | Age: 80
End: 2025-07-25
Payer: MEDICARE

## 2025-07-25 NOTE — TELEPHONE ENCOUNTER
----- Message from ISIDRO Angeles sent at 7/24/2025  4:06 PM CDT -----  Please let patient know that there was nothing acute on the CT.  ----- Message -----  From: Interface, Rad Results In  Sent: 7/24/2025   3:51 PM CDT  To: Eloisa Louis MD

## 2025-07-28 DIAGNOSIS — Z00.00 ENCOUNTER FOR MEDICARE ANNUAL WELLNESS EXAM: ICD-10-CM

## 2025-07-30 ENCOUNTER — TELEPHONE (OUTPATIENT)
Dept: PRIMARY CARE CLINIC | Facility: CLINIC | Age: 80
End: 2025-07-30
Payer: MEDICARE

## 2025-07-30 NOTE — TELEPHONE ENCOUNTER
Left message for pt requesting a call back at earliest convenience. Patient needs hospital follow up.

## 2025-08-25 ENCOUNTER — TELEPHONE (OUTPATIENT)
Dept: PRIMARY CARE CLINIC | Facility: CLINIC | Age: 80
End: 2025-08-25
Payer: MEDICARE

## 2025-08-27 ENCOUNTER — TELEPHONE (OUTPATIENT)
Dept: PRIMARY CARE CLINIC | Facility: CLINIC | Age: 80
End: 2025-08-27
Payer: MEDICARE